# Patient Record
Sex: FEMALE | Race: WHITE | Employment: FULL TIME | URBAN - METROPOLITAN AREA
[De-identification: names, ages, dates, MRNs, and addresses within clinical notes are randomized per-mention and may not be internally consistent; named-entity substitution may affect disease eponyms.]

---

## 2017-05-08 ENCOUNTER — HOSPITAL ENCOUNTER (OUTPATIENT)
Dept: MAMMOGRAPHY | Age: 52
Discharge: HOME | End: 2017-05-08
Attending: OBSTETRICS & GYNECOLOGY
Payer: COMMERCIAL

## 2017-05-08 DIAGNOSIS — Z12.31 VISIT FOR SCREENING MAMMOGRAM: ICD-10-CM

## 2017-05-08 PROCEDURE — 77067 SCR MAMMO BI INCL CAD: CPT

## 2017-05-11 PROBLEM — Z78.0 POSTMENOPAUSAL ESTROGEN DEFICIENCY: Status: ACTIVE | Noted: 2017-05-11

## 2018-01-24 ENCOUNTER — HOSPITAL ENCOUNTER (OUTPATIENT)
Dept: LAB | Age: 53
Discharge: HOME | End: 2018-01-24
Payer: COMMERCIAL

## 2018-01-24 DIAGNOSIS — Z00.00 ANNUAL PHYSICAL EXAM: ICD-10-CM

## 2018-01-24 LAB
ALBUMIN SERPL-MCNC: 3.9 G/DL (ref 3.5–5)
ALBUMIN/GLOB SERPL: 1.3 {RATIO} (ref 1.2–3.5)
ALP SERPL-CCNC: 67 U/L (ref 50–136)
ALT SERPL-CCNC: 34 U/L (ref 12–65)
ANION GAP SERPL CALC-SCNC: 7 MMOL/L (ref 7–16)
AST SERPL-CCNC: 22 U/L (ref 15–37)
BASOPHILS # BLD: 0.1 K/UL (ref 0–0.2)
BASOPHILS NFR BLD: 1 % (ref 0–2)
BILIRUB SERPL-MCNC: 0.4 MG/DL (ref 0.2–1.1)
BUN SERPL-MCNC: 17 MG/DL (ref 6–23)
CALCIUM SERPL-MCNC: 9.2 MG/DL (ref 8.3–10.4)
CHLORIDE SERPL-SCNC: 102 MMOL/L (ref 98–107)
CHOLEST SERPL-MCNC: 187 MG/DL
CO2 SERPL-SCNC: 29 MMOL/L (ref 21–32)
CREAT SERPL-MCNC: 0.74 MG/DL (ref 0.6–1)
CRP SERPL-MCNC: <0.3 MG/DL (ref 0–0.9)
DIFFERENTIAL METHOD BLD: ABNORMAL
EOSINOPHIL # BLD: 0.1 K/UL (ref 0–0.8)
EOSINOPHIL NFR BLD: 2 % (ref 0.5–7.8)
ERYTHROCYTE [DISTWIDTH] IN BLOOD BY AUTOMATED COUNT: 13.4 % (ref 11.9–14.6)
EST. AVERAGE GLUCOSE BLD GHB EST-MCNC: 105 MG/DL
GLOBULIN SER CALC-MCNC: 3.1 G/DL (ref 2.3–3.5)
GLUCOSE SERPL-MCNC: 80 MG/DL (ref 65–100)
HBA1C MFR BLD: 5.3 % (ref 4.8–6)
HCT VFR BLD AUTO: 42.2 % (ref 35.8–46.3)
HDLC SERPL-MCNC: 100 MG/DL (ref 40–60)
HDLC SERPL: 1.9 {RATIO}
HGB BLD-MCNC: 14 G/DL (ref 11.7–15.4)
IMM GRANULOCYTES # BLD: 0 K/UL (ref 0–0.5)
IMM GRANULOCYTES NFR BLD AUTO: 0 % (ref 0–5)
LDLC SERPL CALC-MCNC: 70.4 MG/DL
LIPID PROFILE,FLP: ABNORMAL
LYMPHOCYTES # BLD: 1.8 K/UL (ref 0.5–4.6)
LYMPHOCYTES NFR BLD: 21 % (ref 13–44)
MCH RBC QN AUTO: 30.7 PG (ref 26.1–32.9)
MCHC RBC AUTO-ENTMCNC: 33.2 G/DL (ref 31.4–35)
MCV RBC AUTO: 92.5 FL (ref 79.6–97.8)
MONOCYTES # BLD: 0.5 K/UL (ref 0.1–1.3)
MONOCYTES NFR BLD: 7 % (ref 4–12)
NEUTS SEG # BLD: 5.7 K/UL (ref 1.7–8.2)
NEUTS SEG NFR BLD: 69 % (ref 43–78)
PLATELET # BLD AUTO: 252 K/UL (ref 150–450)
PMV BLD AUTO: 10.3 FL (ref 10.8–14.1)
POTASSIUM SERPL-SCNC: 4.2 MMOL/L (ref 3.5–5.1)
PROT SERPL-MCNC: 7 G/DL (ref 6.3–8.2)
RBC # BLD AUTO: 4.56 M/UL (ref 4.05–5.25)
SODIUM SERPL-SCNC: 138 MMOL/L (ref 136–145)
TRIGL SERPL-MCNC: 83 MG/DL (ref 35–150)
TSH SERPL DL<=0.005 MIU/L-ACNC: 1.26 UIU/ML (ref 0.36–3.74)
VLDLC SERPL CALC-MCNC: 16.6 MG/DL (ref 6–23)
WBC # BLD AUTO: 8.2 K/UL (ref 4.3–11.1)

## 2018-01-24 PROCEDURE — 85025 COMPLETE CBC W/AUTO DIFF WBC: CPT | Performed by: NURSE PRACTITIONER

## 2018-01-24 PROCEDURE — 84443 ASSAY THYROID STIM HORMONE: CPT | Performed by: NURSE PRACTITIONER

## 2018-01-24 PROCEDURE — 80053 COMPREHEN METABOLIC PANEL: CPT | Performed by: NURSE PRACTITIONER

## 2018-01-24 PROCEDURE — 86140 C-REACTIVE PROTEIN: CPT | Performed by: NURSE PRACTITIONER

## 2018-01-24 PROCEDURE — 36415 COLL VENOUS BLD VENIPUNCTURE: CPT | Performed by: NURSE PRACTITIONER

## 2018-01-24 PROCEDURE — 83036 HEMOGLOBIN GLYCOSYLATED A1C: CPT | Performed by: NURSE PRACTITIONER

## 2018-01-24 PROCEDURE — 80061 LIPID PANEL: CPT | Performed by: NURSE PRACTITIONER

## 2018-06-01 ENCOUNTER — HOSPITAL ENCOUNTER (OUTPATIENT)
Age: 53
Setting detail: OUTPATIENT SURGERY
Discharge: HOME | End: 2018-06-01
Attending: INTERNAL MEDICINE | Admitting: INTERNAL MEDICINE
Payer: COMMERCIAL

## 2018-06-01 ENCOUNTER — ANESTHESIA (OUTPATIENT)
Dept: ENDOSCOPY | Age: 53
End: 2018-06-01
Payer: COMMERCIAL

## 2018-06-01 ENCOUNTER — ANESTHESIA EVENT (OUTPATIENT)
Dept: ENDOSCOPY | Age: 53
End: 2018-06-01
Payer: COMMERCIAL

## 2018-06-01 VITALS
HEART RATE: 64 BPM | HEIGHT: 66 IN | TEMPERATURE: 98.5 F | RESPIRATION RATE: 16 BRPM | DIASTOLIC BLOOD PRESSURE: 60 MMHG | BODY MASS INDEX: 25.39 KG/M2 | SYSTOLIC BLOOD PRESSURE: 124 MMHG | OXYGEN SATURATION: 98 % | WEIGHT: 158 LBS

## 2018-06-01 PROCEDURE — 76040000025: Performed by: INTERNAL MEDICINE

## 2018-06-01 PROCEDURE — 76060000031 HC ANESTHESIA FIRST 0.5 HR: Performed by: INTERNAL MEDICINE

## 2018-06-01 PROCEDURE — 74011250636 HC RX REV CODE- 250/636: Performed by: ANESTHESIOLOGY

## 2018-06-01 PROCEDURE — 74011250636 HC RX REV CODE- 250/636

## 2018-06-01 RX ORDER — SODIUM CHLORIDE, SODIUM LACTATE, POTASSIUM CHLORIDE, CALCIUM CHLORIDE 600; 310; 30; 20 MG/100ML; MG/100ML; MG/100ML; MG/100ML
100 INJECTION, SOLUTION INTRAVENOUS CONTINUOUS
Status: DISCONTINUED | OUTPATIENT
Start: 2018-06-01 | End: 2018-06-01 | Stop reason: HOSPADM

## 2018-06-01 RX ORDER — MIDAZOLAM HYDROCHLORIDE 1 MG/ML
INJECTION, SOLUTION INTRAMUSCULAR; INTRAVENOUS AS NEEDED
Status: DISCONTINUED | OUTPATIENT
Start: 2018-06-01 | End: 2018-06-01 | Stop reason: HOSPADM

## 2018-06-01 RX ORDER — PROPOFOL 10 MG/ML
INJECTION, EMULSION INTRAVENOUS AS NEEDED
Status: DISCONTINUED | OUTPATIENT
Start: 2018-06-01 | End: 2018-06-01 | Stop reason: HOSPADM

## 2018-06-01 RX ORDER — SODIUM CHLORIDE, SODIUM LACTATE, POTASSIUM CHLORIDE, CALCIUM CHLORIDE 600; 310; 30; 20 MG/100ML; MG/100ML; MG/100ML; MG/100ML
100 INJECTION, SOLUTION INTRAVENOUS CONTINUOUS
Status: CANCELLED | OUTPATIENT
Start: 2018-06-01

## 2018-06-01 RX ORDER — PROPOFOL 10 MG/ML
INJECTION, EMULSION INTRAVENOUS
Status: DISCONTINUED | OUTPATIENT
Start: 2018-06-01 | End: 2018-06-01 | Stop reason: HOSPADM

## 2018-06-01 RX ORDER — ONDANSETRON 2 MG/ML
INJECTION INTRAMUSCULAR; INTRAVENOUS AS NEEDED
Status: DISCONTINUED | OUTPATIENT
Start: 2018-06-01 | End: 2018-06-01 | Stop reason: HOSPADM

## 2018-06-01 RX ORDER — SODIUM CHLORIDE 0.9 % (FLUSH) 0.9 %
5-10 SYRINGE (ML) INJECTION AS NEEDED
Status: CANCELLED | OUTPATIENT
Start: 2018-06-01

## 2018-06-01 RX ORDER — MIDAZOLAM HYDROCHLORIDE 1 MG/ML
INJECTION, SOLUTION INTRAMUSCULAR; INTRAVENOUS
Status: COMPLETED
Start: 2018-06-01 | End: 2018-06-01

## 2018-06-01 RX ADMIN — PROPOFOL 300 MCG/KG/MIN: 10 INJECTION, EMULSION INTRAVENOUS at 12:39

## 2018-06-01 RX ADMIN — SODIUM CHLORIDE, SODIUM LACTATE, POTASSIUM CHLORIDE, AND CALCIUM CHLORIDE 100 ML/HR: 600; 310; 30; 20 INJECTION, SOLUTION INTRAVENOUS at 12:08

## 2018-06-01 RX ADMIN — MIDAZOLAM HYDROCHLORIDE 2 MG: 1 INJECTION, SOLUTION INTRAMUSCULAR; INTRAVENOUS at 12:30

## 2018-06-01 RX ADMIN — PROPOFOL 50 MG: 10 INJECTION, EMULSION INTRAVENOUS at 12:39

## 2018-06-01 RX ADMIN — ONDANSETRON 4 MG: 2 INJECTION INTRAMUSCULAR; INTRAVENOUS at 12:44

## 2018-06-01 NOTE — PROGRESS NOTES
Pt to dc via wc. Pt stable at time of dc. Family received dc instructions, verbalized understanding.  Family agreed to correct dc instructions given

## 2018-06-01 NOTE — INTERVAL H&P NOTE
H&P Update:  Shu Vasquez was seen and examined. History and physical has been reviewed. The patient has been examined.  There have been no significant clinical changes since the completion of the originally dated History and Physical.    Signed By: Susan Collazo MD     June 1, 2018 12:26 PM

## 2018-06-01 NOTE — PROCEDURES
COLONOSCOPY    DATE of PROCEDURE: 6/1/2018    PT NAME: Mesha Gar     [id number]    MEDICATION:   MAC    INSTRUMENT: CFHQ 190 L    SPECIAL PROCEDURE: none  PREP: good  BLOOD LOSS- 0 or min. SPEC- no  IMPLANTS- NONE  PROCEDURE: standard colonoscopy to terminal ileum w/ retroflex view of right colon    ASSESSMENT:  1. Normal mucos  2. Moderate  to large Internal Hemorrhoids    PLAN:  1. Phone F/U  2. Surveillance 5 yrs  3.  Discuss hemorrhoid options    Jonatan Oh MD

## 2018-06-01 NOTE — ANESTHESIA POSTPROCEDURE EVALUATION
Post-Anesthesia Evaluation and Assessment    Patient: Remberto Phipps MRN: [id number]  SSN: [id number]    YOB: 1965  Age: 46 y.o. Sex: female       Cardiovascular Function/Vital Signs  Visit Vitals    /59    Pulse (!) 53    Temp 36.9 °C (98.5 °F)    Resp 16    Ht 5' 6\" (1.676 m)    Wt 71.7 kg (158 lb)    SpO2 100%    BMI 25.5 kg/m2       Patient is status post total IV anesthesia anesthesia for Procedure(s):  ESOPHAGOGASTRODUODENOSCOPY (EGD)  COLONOSCOPY/ 26.    Nausea/Vomiting: None    Postoperative hydration reviewed and adequate. Pain:  Pain Scale 1: Numeric (0 - 10) (06/01/18 1203)  Pain Intensity 1: 0 (06/01/18 1203)   Managed    Neurological Status: At baseline    Mental Status and Level of Consciousness: Arousable    Pulmonary Status:   O2 Device: Nasal cannula (06/01/18 1305)   Adequate oxygenation and airway patent    Complications related to anesthesia: None    Post-anesthesia assessment completed.  No concerns    Signed By: See Grier MD     June 1, 2018

## 2018-06-01 NOTE — DISCHARGE INSTRUCTIONS
Gastrointestinal Esophagogastroduodenoscopy (EGD) - Upper Exam Discharge Instructions    1. Call Dr. Carole Tobias at [phone] for any problems or questions. 2. Contact the doctor's office for follow up appointment as directed. 3. Medication may cause drowsiness for several hours, therefore, do not drive or operate machinery for remainder of the day. 4. No alcohol today. 5. Ordinarily, you may resume regular diet and activity after exam unless otherwise specified by your physician. 6. For mild soreness in your throat you may use Cepacol throat lozenges or warm salt-water gargles as needed. Gastrointestinal Colonoscopy/Flexible Sigmoidoscopy - Lower Exam Discharge Instructions  1. Because of air put into your colon during exam, you may experience some abdominal distension, relieved by the passage of gas, for several hours. 2. Contact your physician if you have any of the following:  a. Excessive amount of bleeding - large amount when having a bowel movement. Occasional specks of blood with bowel movement would not be unusual.  b. Severe abdominal pain  c. Fever or Chills  Any additional instructions:  Consider banding      Instructions given to [address] [place] [address] and other family members.   Instructions given by:

## 2018-06-01 NOTE — H&P
>      Patient:  Shoaib Roberts  YOB: 1965   Date:                       2018 3:00 PM   Visit Type:                 Office Visit        This 46year old female presents for Rectal bleeding. History of Present Illness:  1. Rectal bleeding   46year old known female patient to Dr. Raquel Ortiz returns to the clinic today self referred for rectal bleeding. She has continued to have intermittent BRRB since . It is with a BM. It is in the commode and on the TP. She denies any rectal pain or abdominal pain. She has increased \"fullness and gas. \"  She has a BM daily. She is on a probiotic and biocleanse. She does feel emptied out sometimes. She underwent a colonoscopy on 14 by Dr. Raquel Ortiz that revealed 1cm rectal polyp, internal and external hemorrhoids. Her pathology revealed a tubulovillous adenoma with recommended repeat surveillance in 3 years. She denies any anticoagulants. She takes occasional OTC NSAID. She does notice increased bleeding with ETOH. She had a Hgb last week and it was 12.4 per patient report. 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 Raquel Ortiz that revealed 1cm rectal polyp, internal and external hemorrhoids. Her pathology revealed a tubulovillous adenoma with recommended repeat surveillance in 3 years. Family History  (Detailed)  Relationship Family Member Name  Age at Death Condition Onset Age Cause of Death       No family history of Cancer, colon  N   Maternal grandmother    Cancer, throat  N         Social History:  (Detailed)  Tobacco use reviewed. Preferred language is Chriss Schaumann. Smoking status: Former smoker.     SMOKING STATUS  Type Smoking Status Usage Per Day Years Used Total Pack Years Former smoker        ALCOHOL  There is a history of alcohol use. 3-4 drinks consumed weekly. CAFFEINE  The patient uses caffeine: coffee - 3 cups a day. CURRENT MEDICATIONS  Medication Name Sig Desc   Klonopin 1 mg tablet take 1 tablet by oral route  every day as needed   Minivelle 0.1 mg/24 hr transdermal patch apply 1 patch by transdermal route 2 times every week   lamotrigine 25 mg tablet take 2 tablet by oral route 2 times every day   Bystolic 5 mg tablet take 1 tablet by oral route  every day     Allergies:  Ingredient Reaction (Severity) Medication Name Comment   NO KNOWN ALLERGIES      Reviewed, no changes. Review of Systems  System Neg/Pos Details   Cardio Positive Irregular heartbeat/palpitations. GI Positive Rectal bleeding. Psych Positive Anxiety, Panic attacks. All other review of systems are negative. VITAL SIGNS:  BP mm/Hg Pulse/min Resp/min Temp F Height (Total in.) Weight (lbs.) Weight (oz.) BMI   118/62 62 18  66.50 164.80  26.20       PHYSICAL EXAM:  GENERAL: well nourished, well hydrated, who appears their stated age. SKIN: no rashes, jaundice, palmar erythema,or telangiectasias   HEENT:Conjunctiva pink, Sclerae anicteric. No oral ulcerations. PERRLA. Neck without obvious thyromegaly. MALLAMPATI 2  CARDIOVASCULAR: S1 and S2. No murmurs, gallops or rubs. RESPIRATORY:  Vesicular breath sounds bilaterally without  rales or rhonchi. GI: The abdomen is soft, nondistended, and nontender with distracted exam. Normoactive bowel sounds x 4 quadrants. No obvious hepatomegaly or splenomegaly. RECTAL: Deferred. EXTREMITIES: without cyanosis, clubbing, edema or ecchymosis  NEUROLOGICAL:  Patient is alert and oriented x 4 without any obvious gross deficits. PSYCHIATRIC: Mood appears appropriate with judgement intact. LYMPHATIC: No cervical or supraclavicular adenopathy.    ASA 2    Medications Prescribed/Renewed (this encounter)  Medication Directions   Anusol-HC 25 mg rectal suppository insert 1 suppository by rectal route 2 times every day for 2 weeks     Assessment/Plan  # Detail Type Description    1. Assessment History of colon polyps (Z86.010). Provider Plan 46year old known female patient to Dr. Landon Flood returns to the clinic today self referred for rectal bleeding and history of colon polyps. She has continued to have intermittent BRRB since 2014. She underwent a colonoscopy on 5/20/14 by Dr. Landon Flood that revealed 1cm rectal polyp, internal and external hemorrhoids. Her pathology revealed a tubulovillous adenoma with recommended repeat surveillance in 3 years. 1.Schedule surveillance colonoscopy with Dr. Landon Flood. The risks of the procedure was discussed in detail with patient including, but not limited to, anesthesia, bleeding, infection, perforation and missed colon polyp and possible mortality. Patient verbalizes understanding and wishes to proceed with Colonoscopy. Plan Orders She is to schedule a follow-up visit for to be determined after testing/procedure. 2. Assessment Rectal bleeding (K62.5). Provider Plan likely secondary to known hemorrhoids. 1.Trial of Anusol HC supp 25 mg one pr bid x 14 days #28 with 1 RF  2. Continue probiotic    Plan Orders Further diagnostic evaluations ordered today include(s) Colonoscopy to be performed. 3. Assessment Internal hemorrhoids (K64.8). Provider Plan on previous colonoscopy and with current rectal bleeding. 1.Hemorrhoidal handout                  Counseling / Educational Factors:  Items reviewed / discussed during today's visit: prior testing / procedures were reviewed; indications and risks of the procedure were discussed; prescription medication usage was discussed; symptomatic care was discussed; advised to continue current treatment. Patient expressed understanding of instructions. Other advice: HEMORRHOIDAL HANDOUT.     The patient was checked out at 3:40 PM by [facility]. Milagro. Elements of this note may have been dictated using speech recognition software. As a result, errors of speech recognition may have occurred. Provider:   Shan Arteaga 05/08/2018 3:46 PM   Document generated by: Mac Osorio. Alyson Raymond 05/08/2018    CC Providers:  Xiomara Ames MD, MD  [facility]  [address] [facility] [place] [address]-          Electronically signed by Lynsey Alejo on 05/08/2018 03:46 PM

## 2018-06-01 NOTE — PROCEDURES
ESOPHAGOGASTRODUODENOSCOPY    DATE of PROCEDURE: 6/1/2018    PT NAME: Ward Gar     [id number]    MEDICATION:   MAC        INSTRUMENT: GIFH 190    SPECIAL PROCEDURE: none  BLOOD LOSS- 0 or min. SPEC- no  IMPLANT- none    PROCEDURE:  Standard EGD      ASSESSMENT:  1. Normal     PLAN:   1.  Edi Anguiano MD

## 2018-06-01 NOTE — ANESTHESIA PREPROCEDURE EVALUATION
Anesthetic History   No history of anesthetic complications            Review of Systems / Medical History  Patient summary reviewed, nursing notes reviewed and pertinent labs reviewed    Pulmonary          Smoker         Neuro/Psych         Psychiatric history    Comments: Anxiety Cardiovascular            Dysrhythmias : PVC      Exercise tolerance: >4 METS     GI/Hepatic/Renal  Within defined limits              Endo/Other  Within defined limits           Other Findings              Physical Exam    Airway  Mallampati: II    Neck ROM: normal range of motion   Mouth opening: Normal     Cardiovascular    Rhythm: regular           Dental  No notable dental hx       Pulmonary  Breath sounds clear to auscultation               Abdominal         Other Findings            Anesthetic Plan    ASA: 2  Anesthesia type: total IV anesthesia            Anesthetic plan and risks discussed with: Patient

## 2018-06-14 ENCOUNTER — HOSPITAL ENCOUNTER (OUTPATIENT)
Dept: MAMMOGRAPHY | Age: 53
Discharge: HOME | End: 2018-06-14
Attending: OBSTETRICS & GYNECOLOGY
Payer: COMMERCIAL

## 2018-06-14 DIAGNOSIS — R92.2 DENSE BREAST TISSUE ON MAMMOGRAM: ICD-10-CM

## 2018-06-14 DIAGNOSIS — Z12.31 SCREENING MAMMOGRAM, ENCOUNTER FOR: ICD-10-CM

## 2018-06-14 PROCEDURE — 77063 BREAST TOMOSYNTHESIS BI: CPT

## 2018-06-28 ENCOUNTER — HOSPITAL ENCOUNTER (OUTPATIENT)
Dept: MAMMOGRAPHY | Age: 53
Discharge: HOME | End: 2018-06-28
Attending: OBSTETRICS & GYNECOLOGY
Payer: COMMERCIAL

## 2018-06-28 DIAGNOSIS — R92.8 ABNORMAL FINDING ON MAMMOGRAPHY: ICD-10-CM

## 2018-06-28 PROCEDURE — 76642 ULTRASOUND BREAST LIMITED: CPT

## 2018-08-31 ENCOUNTER — HOSPITAL ENCOUNTER (OUTPATIENT)
Dept: LAB | Age: 53
Discharge: HOME | End: 2018-08-31
Payer: COMMERCIAL

## 2018-08-31 DIAGNOSIS — Z00.00 ROUTINE GENERAL MEDICAL EXAMINATION AT A HEALTH CARE FACILITY: ICD-10-CM

## 2018-08-31 LAB
ALBUMIN SERPL-MCNC: 3.3 G/DL (ref 3.5–5)
ALBUMIN/GLOB SERPL: 1.1 {RATIO} (ref 1.2–3.5)
ALP SERPL-CCNC: 68 U/L (ref 50–136)
ALT SERPL-CCNC: 28 U/L (ref 12–65)
ANION GAP SERPL CALC-SCNC: 8 MMOL/L (ref 7–16)
APPEARANCE UR: CLEAR
AST SERPL-CCNC: 17 U/L (ref 15–37)
BASOPHILS # BLD: 0.1 K/UL (ref 0–0.2)
BASOPHILS NFR BLD: 1 % (ref 0–2)
BILIRUB SERPL-MCNC: 0.2 MG/DL (ref 0.2–1.1)
BILIRUB UR QL: NEGATIVE
BUN SERPL-MCNC: 12 MG/DL (ref 6–23)
CALCIUM SERPL-MCNC: 8.4 MG/DL (ref 8.3–10.4)
CHLORIDE SERPL-SCNC: 110 MMOL/L (ref 98–107)
CHOLEST SERPL-MCNC: 161 MG/DL
CO2 SERPL-SCNC: 27 MMOL/L (ref 21–32)
COLOR UR: YELLOW
CREAT SERPL-MCNC: 0.75 MG/DL (ref 0.6–1)
DIFFERENTIAL METHOD BLD: NORMAL
EOSINOPHIL # BLD: 0.2 K/UL (ref 0–0.8)
EOSINOPHIL NFR BLD: 4 % (ref 0.5–7.8)
ERYTHROCYTE [DISTWIDTH] IN BLOOD BY AUTOMATED COUNT: 14.1 %
EST. AVERAGE GLUCOSE BLD GHB EST-MCNC: NORMAL MG/DL
GLOBULIN SER CALC-MCNC: 3.1 G/DL (ref 2.3–3.5)
GLUCOSE SERPL-MCNC: 71 MG/DL (ref 65–100)
GLUCOSE UR STRIP.AUTO-MCNC: NEGATIVE MG/DL
HBA1C MFR BLD: 4.8 % (ref 4.8–6)
HCT VFR BLD AUTO: 38.1 % (ref 35.8–46.3)
HDLC SERPL-MCNC: 76 MG/DL (ref 40–60)
HDLC SERPL: 2.1 {RATIO}
HGB BLD-MCNC: 12.3 G/DL (ref 11.7–15.4)
HGB UR QL STRIP: NEGATIVE
IMM GRANULOCYTES # BLD: 0 K/UL (ref 0–0.5)
IMM GRANULOCYTES NFR BLD AUTO: 0 % (ref 0–5)
KETONES UR QL STRIP.AUTO: ABNORMAL MG/DL
LDLC SERPL CALC-MCNC: 49.6 MG/DL
LEUKOCYTE ESTERASE UR QL STRIP.AUTO: NEGATIVE
LIPID PROFILE,FLP: ABNORMAL
LYMPHOCYTES # BLD: 1.6 K/UL (ref 0.5–4.6)
LYMPHOCYTES NFR BLD: 32 % (ref 13–44)
MCH RBC QN AUTO: 30.4 PG (ref 26.1–32.9)
MCHC RBC AUTO-ENTMCNC: 32.3 G/DL (ref 31.4–35)
MCV RBC AUTO: 94.1 FL (ref 79.6–97.8)
MONOCYTES # BLD: 0.3 K/UL (ref 0.1–1.3)
MONOCYTES NFR BLD: 6 % (ref 4–12)
NEUTS SEG # BLD: 2.8 K/UL (ref 1.7–8.2)
NEUTS SEG NFR BLD: 57 % (ref 43–78)
NITRITE UR QL STRIP.AUTO: NEGATIVE
NRBC # BLD: 0 K/UL (ref 0–0.2)
PH UR STRIP: 7.5 [PH] (ref 5–9)
PLATELET # BLD AUTO: 255 K/UL (ref 150–450)
PMV BLD AUTO: 10.7 FL (ref 9.4–12.3)
POTASSIUM SERPL-SCNC: 4.1 MMOL/L (ref 3.5–5.1)
PROT SERPL-MCNC: 6.4 G/DL (ref 6.3–8.2)
PROT UR STRIP-MCNC: ABNORMAL MG/DL
RBC # BLD AUTO: 4.05 M/UL (ref 4.05–5.2)
SODIUM SERPL-SCNC: 145 MMOL/L (ref 136–145)
SP GR UR REFRACTOMETRY: 1.02 (ref 1–1.02)
TRIGL SERPL-MCNC: 177 MG/DL (ref 35–150)
TSH SERPL DL<=0.005 MIU/L-ACNC: 0.93 UIU/ML (ref 0.36–3.74)
UROBILINOGEN UR QL STRIP.AUTO: 0.2 EU/DL (ref 0.2–1)
VLDLC SERPL CALC-MCNC: 35.4 MG/DL (ref 6–23)
WBC # BLD AUTO: 5 K/UL (ref 4.3–11.1)

## 2018-08-31 PROCEDURE — 80053 COMPREHEN METABOLIC PANEL: CPT

## 2018-08-31 PROCEDURE — 84443 ASSAY THYROID STIM HORMONE: CPT

## 2018-08-31 PROCEDURE — 36415 COLL VENOUS BLD VENIPUNCTURE: CPT

## 2018-08-31 PROCEDURE — 80061 LIPID PANEL: CPT

## 2018-08-31 PROCEDURE — 85025 COMPLETE CBC W/AUTO DIFF WBC: CPT

## 2018-08-31 PROCEDURE — 83036 HEMOGLOBIN GLYCOSYLATED A1C: CPT

## 2018-08-31 PROCEDURE — 81003 URINALYSIS AUTO W/O SCOPE: CPT

## 2018-09-05 PROBLEM — K59.09 CHRONIC CONSTIPATION: Status: ACTIVE | Noted: 2018-09-05

## 2018-09-05 PROBLEM — K64.8 INTERNAL HEMORRHOID: Status: ACTIVE | Noted: 2018-09-05

## 2019-09-12 PROBLEM — Z72.0 TOBACCO USE: Status: ACTIVE | Noted: 2019-09-12

## 2019-09-26 ENCOUNTER — HOSPITAL ENCOUNTER (OUTPATIENT)
Dept: LAB | Age: 54
Discharge: HOME | End: 2019-09-26
Payer: COMMERCIAL

## 2019-09-26 DIAGNOSIS — R53.82 CHRONIC FATIGUE: ICD-10-CM

## 2019-09-26 PROCEDURE — 82306 VITAMIN D 25 HYDROXY: CPT

## 2019-09-26 PROCEDURE — 36415 COLL VENOUS BLD VENIPUNCTURE: CPT

## 2019-09-27 LAB — 25(OH)D3+25(OH)D2 SERPL-MCNC: 38.8 NG/ML (ref 30–100)

## 2020-01-20 ENCOUNTER — HOSPITAL ENCOUNTER (OUTPATIENT)
Dept: MAMMOGRAPHY | Age: 55
Discharge: HOME | End: 2020-01-20
Attending: INTERNAL MEDICINE
Payer: COMMERCIAL

## 2020-01-20 DIAGNOSIS — Z12.31 ENCOUNTER FOR SCREENING MAMMOGRAM FOR BREAST CANCER: ICD-10-CM

## 2020-01-20 PROCEDURE — 77067 SCR MAMMO BI INCL CAD: CPT

## 2021-01-12 ENCOUNTER — TRANSCRIBE ORDER (OUTPATIENT)
Dept: SCHEDULING | Age: 56
End: 2021-01-12

## 2021-01-12 DIAGNOSIS — Z12.31 SCREENING MAMMOGRAM FOR HIGH-RISK PATIENT: Primary | ICD-10-CM

## 2021-01-25 ENCOUNTER — HOSPITAL ENCOUNTER (OUTPATIENT)
Dept: MAMMOGRAPHY | Age: 56
Discharge: HOME | End: 2021-01-25
Attending: OBSTETRICS & GYNECOLOGY
Payer: COMMERCIAL

## 2021-01-25 DIAGNOSIS — Z12.31 SCREENING MAMMOGRAM FOR HIGH-RISK PATIENT: ICD-10-CM

## 2021-01-25 PROCEDURE — 77063 BREAST TOMOSYNTHESIS BI: CPT

## 2022-01-19 ENCOUNTER — TRANSCRIBE ORDER (OUTPATIENT)
Dept: SCHEDULING | Age: 57
End: 2022-01-19

## 2022-01-19 DIAGNOSIS — Z12.31 ENCOUNTER FOR SCREENING MAMMOGRAM FOR MALIGNANT NEOPLASM OF BREAST: Primary | ICD-10-CM

## 2022-03-16 ENCOUNTER — HOSPITAL ENCOUNTER (OUTPATIENT)
Dept: MAMMOGRAPHY | Age: 57
Discharge: HOME | End: 2022-03-16
Attending: OBSTETRICS & GYNECOLOGY
Payer: COMMERCIAL

## 2022-03-16 DIAGNOSIS — Z12.31 ENCOUNTER FOR SCREENING MAMMOGRAM FOR MALIGNANT NEOPLASM OF BREAST: ICD-10-CM

## 2022-03-16 PROCEDURE — 77063 BREAST TOMOSYNTHESIS BI: CPT

## 2022-03-18 PROBLEM — Z78.0 POSTMENOPAUSAL ESTROGEN DEFICIENCY: Status: ACTIVE | Noted: 2017-05-11

## 2022-03-18 PROBLEM — K64.8 INTERNAL HEMORRHOID: Status: ACTIVE | Noted: 2018-09-05

## 2022-03-20 PROBLEM — Z72.0 TOBACCO USE: Status: ACTIVE | Noted: 2019-09-12

## 2022-03-20 PROBLEM — K59.09 CHRONIC CONSTIPATION: Status: ACTIVE | Noted: 2018-09-05

## 2022-03-23 ENCOUNTER — HOSPITAL ENCOUNTER (OUTPATIENT)
Dept: LAB | Age: 57
Discharge: HOME | End: 2022-03-23
Payer: COMMERCIAL

## 2022-03-23 DIAGNOSIS — Z00.00 ANNUAL PHYSICAL EXAM: ICD-10-CM

## 2022-03-23 LAB
ALBUMIN SERPL-MCNC: 3.6 G/DL (ref 3.5–5)
ALBUMIN/GLOB SERPL: 1.2 {RATIO} (ref 1.2–3.5)
ALP SERPL-CCNC: 73 U/L (ref 50–136)
ALT SERPL-CCNC: 54 U/L (ref 12–65)
ANION GAP SERPL CALC-SCNC: 2 MMOL/L (ref 7–16)
AST SERPL-CCNC: 34 U/L (ref 15–37)
BASOPHILS # BLD: 0.1 K/UL (ref 0–0.2)
BASOPHILS NFR BLD: 1 % (ref 0–2)
BILIRUB SERPL-MCNC: 0.5 MG/DL (ref 0.2–1.1)
BUN SERPL-MCNC: 19 MG/DL (ref 6–23)
CALCIUM SERPL-MCNC: 9.5 MG/DL (ref 8.3–10.4)
CHLORIDE SERPL-SCNC: 109 MMOL/L (ref 98–107)
CHOLEST SERPL-MCNC: 207 MG/DL
CO2 SERPL-SCNC: 28 MMOL/L (ref 21–32)
CREAT SERPL-MCNC: 0.8 MG/DL (ref 0.6–1)
DIFFERENTIAL METHOD BLD: NORMAL
EOSINOPHIL # BLD: 0.2 K/UL (ref 0–0.8)
EOSINOPHIL NFR BLD: 4 % (ref 0.5–7.8)
ERYTHROCYTE [DISTWIDTH] IN BLOOD BY AUTOMATED COUNT: 14.3 % (ref 11.9–14.6)
GLOBULIN SER CALC-MCNC: 3 G/DL (ref 2.3–3.5)
GLUCOSE SERPL-MCNC: 94 MG/DL (ref 65–100)
HCT VFR BLD AUTO: 39 % (ref 35.8–46.3)
HDLC SERPL-MCNC: 105 MG/DL (ref 40–60)
HDLC SERPL: 2 {RATIO}
HGB BLD-MCNC: 12.5 G/DL (ref 11.7–15.4)
IMM GRANULOCYTES # BLD AUTO: 0 K/UL (ref 0–0.5)
IMM GRANULOCYTES NFR BLD AUTO: 0 % (ref 0–5)
LDLC SERPL CALC-MCNC: 87.2 MG/DL
LYMPHOCYTES # BLD: 1.6 K/UL (ref 0.5–4.6)
LYMPHOCYTES NFR BLD: 32 % (ref 13–44)
MCH RBC QN AUTO: 29.1 PG (ref 26.1–32.9)
MCHC RBC AUTO-ENTMCNC: 32.1 G/DL (ref 31.4–35)
MCV RBC AUTO: 90.7 FL (ref 79.6–97.8)
MONOCYTES # BLD: 0.4 K/UL (ref 0.1–1.3)
MONOCYTES NFR BLD: 8 % (ref 4–12)
NEUTS SEG # BLD: 2.8 K/UL (ref 1.7–8.2)
NEUTS SEG NFR BLD: 55 % (ref 43–78)
NRBC # BLD: 0 K/UL (ref 0–0.2)
PLATELET # BLD AUTO: 232 K/UL (ref 150–450)
PMV BLD AUTO: 10.7 FL (ref 9.4–12.3)
POTASSIUM SERPL-SCNC: 4.1 MMOL/L (ref 3.5–5.1)
PROT SERPL-MCNC: 6.6 G/DL (ref 6.3–8.2)
RBC # BLD AUTO: 4.3 M/UL (ref 4.05–5.2)
SODIUM SERPL-SCNC: 139 MMOL/L (ref 136–145)
TRIGL SERPL-MCNC: 74 MG/DL (ref 35–150)
TSH SERPL DL<=0.005 MIU/L-ACNC: 1.1 UIU/ML (ref 0.36–3.74)
VLDLC SERPL CALC-MCNC: 14.8 MG/DL (ref 6–23)
WBC # BLD AUTO: 5.1 K/UL (ref 4.3–11.1)

## 2022-03-23 PROCEDURE — 36415 COLL VENOUS BLD VENIPUNCTURE: CPT

## 2022-03-23 PROCEDURE — 85025 COMPLETE CBC W/AUTO DIFF WBC: CPT

## 2022-03-23 PROCEDURE — 80061 LIPID PANEL: CPT

## 2022-03-23 PROCEDURE — 84443 ASSAY THYROID STIM HORMONE: CPT

## 2022-03-23 PROCEDURE — 80053 COMPREHEN METABOLIC PANEL: CPT

## 2022-05-24 ENCOUNTER — OFFICE VISIT (OUTPATIENT)
Dept: OBGYN CLINIC | Age: 57
End: 2022-05-24
Payer: COMMERCIAL

## 2022-05-24 VITALS
HEIGHT: 66 IN | DIASTOLIC BLOOD PRESSURE: 60 MMHG | SYSTOLIC BLOOD PRESSURE: 110 MMHG | BODY MASS INDEX: 25.43 KG/M2 | WEIGHT: 158.2 LBS

## 2022-05-24 DIAGNOSIS — Z01.419 WELL WOMAN EXAM: Primary | ICD-10-CM

## 2022-05-24 DIAGNOSIS — Z11.51 SCREENING FOR HUMAN PAPILLOMAVIRUS (HPV): ICD-10-CM

## 2022-05-24 DIAGNOSIS — Z12.4 SCREENING FOR CERVICAL CANCER: ICD-10-CM

## 2022-05-24 DIAGNOSIS — Z13.89 SCREENING FOR GENITOURINARY CONDITION: ICD-10-CM

## 2022-05-24 DIAGNOSIS — Z12.31 SCREENING MAMMOGRAM FOR BREAST CANCER: ICD-10-CM

## 2022-05-24 LAB
BILIRUBIN, URINE, POC: NEGATIVE
BLOOD URINE, POC: NEGATIVE
GLUCOSE URINE, POC: NEGATIVE
KETONES, URINE, POC: NORMAL
LEUKOCYTE ESTERASE, URINE, POC: NEGATIVE
NITRITE, URINE, POC: NEGATIVE
PH, URINE, POC: 5.5 (ref 4.6–8)
PROTEIN,URINE, POC: NEGATIVE
SPECIFIC GRAVITY, URINE, POC: 1.03 (ref 1–1.03)
URINALYSIS CLARITY, POC: CLEAR
URINALYSIS COLOR, POC: YELLOW
UROBILINOGEN, POC: NORMAL

## 2022-05-24 PROCEDURE — 81000 URINALYSIS NONAUTO W/SCOPE: CPT | Performed by: OBSTETRICS & GYNECOLOGY

## 2022-05-24 PROCEDURE — 99396 PREV VISIT EST AGE 40-64: CPT | Performed by: OBSTETRICS & GYNECOLOGY

## 2022-05-24 RX ORDER — ESTRADIOL 0.1 MG/D
1 FILM, EXTENDED RELEASE TRANSDERMAL
Qty: 24 PATCH | Refills: 3 | Status: SHIPPED | OUTPATIENT
Start: 2022-05-26

## 2022-05-24 NOTE — PROGRESS NOTES
HPI:  Ms. Indigo Cage is a 64 y.o.   OB History        2    Para   2    Term   2            AB        Living   2       SAB        IAB        Ectopic        Molar        Multiple        Live Births   2             who is here today for a well woman exam. She complains of nothing. Needs refills on hormones. States she does have bleeding after having a bowel movement. Will be following up with Carla Tomlinson regarding her internal hemorrhoids     Date Performed Result   PAP 2019 Negative HPV negative with Genella Perez    Mammogram 3/16/2022 Negative BD3   Colonoscopy  WNL   Dexa na          No obstetric history on file. GYN History           No LMP recorded. Patient has had a hysterectomy. Past Medical History:  Past Medical History:   Diagnosis Date    Abnormal Pap smear     Arrhythmia     wpw-- in college--- no meds -- no problems--- no symptoms    Colon polyp     Benign    Constipation     Menopause     Panic disorder     Pelvic pain in female 2014    Rectal bleeding x 6 months    Routine gynecological examination 2014    Screening for malignant neoplasm of the rectum 2014    Snores     never been tested for sleep apnea       Past Surgical History:  Past Surgical History:   Procedure Laterality Date    BREAST SURGERY  age 27--     then removed     COLONOSCOPY      Polyp- TVA    COLONOSCOPY  2018    Internal hemorrhoid    COLONOSCOPY N/A 2018    COLONOSCOPY/ 26 performed by Adriel Gao MD at [facility], TOTAL ABDOMINAL      IMPLANT BREAST SILICONE/EQ Bilateral     Removed - 10 years ago    OTHER SURGICAL HISTORY  2019    Tooth Implant    UPPER GASTROINTESTINAL ENDOSCOPY  2018       Allergies:   No Known Allergies    Medication History:  Current Outpatient Medications   Medication Sig Dispense Refill    clonazePAM (KLONOPIN) 0.5 MG tablet Take 1 mg by mouth 3 times daily as needed.       estradiol (MINIVELLE) 0.1 MG/24HR Place 1 patch onto the skin      lamoTRIgine (LAMICTAL) 25 MG tablet Take 50 mg by mouth 2 times daily      linaCLOtide (LINZESS) 72 MCG CAPS capsule Take 72 mcg by mouth every morning (before breakfast)      nebivolol (BYSTOLIC) 5 MG tablet TAKE 1 TABLET BY MOUTH ONE TIME A DAY      zolpidem (AMBIEN) 5 MG tablet Take 5 mg by mouth.  estradiol (ESTRACE) 1 MG tablet Take 1 mg by mouth daily       No current facility-administered medications for this visit. Social History:  Social History     Socioeconomic History    Marital status:      Spouse name: Not on file    Number of children: Not on file    Years of education: Not on file    Highest education level: Not on file   Occupational History    Not on file   Tobacco Use    Smoking status: Former Smoker     Packs/day: 0.25    Smokeless tobacco: Never Used    Tobacco comment: Quit smoking: Socially   Substance and Sexual Activity    Alcohol use: Yes     Alcohol/week: 2.0 standard drinks    Drug use: No    Sexual activity: Not on file     Comment: Hyst   Other Topics Concern    Not on file   Social History Narrative    Not on file     Social Determinants of Health     Financial Resource Strain:     Difficulty of Paying Living Expenses: Not on file   Food Insecurity:     Worried About Running Out of Food in the Last Year: Not on file    [place] of Food in the Last Year: Not on file   Transportation Needs:     Lack of Transportation (Medical): Not on file    Lack of Transportation (Non-Medical):  Not on file   Physical Activity:     Days of Exercise per Week: Not on file    Minutes of Exercise per Session: Not on file   Stress:     Feeling of Stress : Not on file   Social Connections:     Frequency of Communication with Friends and Family: Not on file    Frequency of Social Gatherings with Friends and Family: Not on file    Attends [employer] Services: Not on file   [employer] of Clubs or Organizations: Not on file    Attends Club or Organization Meetings: Not on file    Marital Status: Not on file   Intimate Partner Violence:     Fear of Current or Ex-Partner: Not on file    Emotionally Abused: Not on file    Physically Abused: Not on file    Sexually Abused: Not on file   Housing Stability:     Unable to Pay for Housing in the Last Year: Not on file    Number of [place] in the Last Year: Not on file    Unstable Housing in the Last Year: Not on file       Family History:  Family History   Problem Relation Age of Onset    Heart Disease Father     Coronary Art Dis Father     Cataracts Father     Anemia Mother         pernicious    Thyroid Disease Mother     Cataracts Mother     Heart Disease Paternal Grandfather     Coronary Art Dis Other         fam hx     Breast Cancer Paternal Grandmother         66's    Cancer Brother         CLL- Leukemia    Cancer Maternal Grandmother         Larengeal Ca     Heart Attack Maternal Grandfather     Diabetes Mother         Type 2       Review of Systems - General ROS: negative except for that discussed in HPI      ROS:  Feeling well. No dyspnea or chest pain on exertion. No abdominal pain, change in bowel habits, black or bloody stools. No urinary tract symptoms. No neurological complaints. Objective:   /60   Ht 5' 6\" (1.676 m)   Wt 158 lb 3.2 oz (71.8 kg)   BMI 25.53 kg/m²   [email or web address]    The patient appears well, alert, oriented x 3, in no distress. ENT normal.  Neck supple. No adenopathy or thyromegaly. Lungs:  clear, good air entry, no wheezes, rhonchi or rales. Heart:  S1 and S2 normal, no murmurs, regular rate and rhythm. Abdomen:  soft without tenderness, guarding, mass or organomegaly. Extremities show no edema, normal peripheral pulses. Neurological is normal, no focal findings.     BREAST EXAM: breasts appear normal, no suspicious masses, no skin or nipple changes or axillary nodes    PELVIC EXAM: External genitalia is normal  urethra, urethra meatus and bladder are midline well supported. Vagina is well rugated, Cervix absent  no ovarian masses palpated    Assessment/Plan:      Diagnosis Orders   1. Well woman exam  AMB POC URINALYSIS DIP STICK MANUAL W/ MICRO    OLGA SHERRIE DIGITAL SCREEN BILATERAL   2. Screening for genitourinary condition  AMB POC URINALYSIS DIP STICK MANUAL W/ MICRO   3. Screening for cervical cancer     4. Screening for human papillomavirus (HPV)     5. Screening mammogram for breast cancer  OLGA SHERRIE DIGITAL SCREEN BILATERAL     Encounter Diagnoses   Name Primary?     Well woman exam Yes    Screening for genitourinary condition     Screening for cervical cancer     Screening for human papillomavirus (HPV)     Screening mammogram for breast cancer      Orders Placed This Encounter   Procedures    OLGA SHERRIE DIGITAL SCREEN BILATERAL     Standing Status:   Future     Standing Expiration Date:   7/24/2023    AMB POC URINALYSIS DIP STICK MANUAL W/ MICRO       counseled on breast self exam and use and side effects of HRT  Continue HRT patch  Pt wished best of luck with new epic they are  Extraordinarily frustrated  return annually or prn

## 2022-11-02 ENCOUNTER — NURSE TRIAGE (OUTPATIENT)
Dept: OTHER | Facility: CLINIC | Age: 57
End: 2022-11-02

## 2022-11-02 NOTE — TELEPHONE ENCOUNTER
Location of patient: [place]    Location of employment: [employer]    Department where injury occurred: OR    Location of injury (body part involved): wrist injury, neck, left back and hip    Time of injury: 10:00    Last 4 of SSN: [id number]    Subjective: Caller states \"tripped over a cord and landed on left hand and hip\"     Current Symptoms: wrist was sore when first happened but that is fine now. Left upper back area and neck is sore  Able to continuing working    Pain Severity: 3/10; Temperature: denies     What has been tried: nothing    LMP: NA Pregnant: NA    Recommended disposition: Mehrdad Arias 7932 advice provided, caller verbalizes understanding; denies any other questions or concerns. SafeCare is completed. Employee will call back as needed.     Reason for Disposition   Back pain or stiffness from bending or twisting injury    Protocols used: Back Injury-ADULT-[place]

## 2023-02-24 ENCOUNTER — TELEPHONE (OUTPATIENT)
Dept: INTERNAL MEDICINE CLINIC | Facility: CLINIC | Age: 58
End: 2023-02-24

## 2023-02-24 DIAGNOSIS — Z00.00 LABORATORY EXAMINATION ORDERED AS PART OF A ROUTINE GENERAL MEDICAL EXAMINATION: Primary | ICD-10-CM

## 2023-03-07 DIAGNOSIS — Z00.00 LABORATORY EXAMINATION ORDERED AS PART OF A ROUTINE GENERAL MEDICAL EXAMINATION: ICD-10-CM

## 2023-03-07 LAB
APPEARANCE UR: CLEAR
BASOPHILS # BLD: 0.1 K/UL (ref 0–0.2)
BASOPHILS NFR BLD: 1 % (ref 0–2)
BILIRUB UR QL: NEGATIVE
COLOR UR: ABNORMAL
DIFFERENTIAL METHOD BLD: ABNORMAL
EOSINOPHIL # BLD: 0.1 K/UL (ref 0–0.8)
EOSINOPHIL NFR BLD: 1 % (ref 0.5–7.8)
ERYTHROCYTE [DISTWIDTH] IN BLOOD BY AUTOMATED COUNT: 13.9 % (ref 11.9–14.6)
GLUCOSE UR STRIP.AUTO-MCNC: NEGATIVE MG/DL
HCT VFR BLD AUTO: 42.8 % (ref 35.8–46.3)
HGB BLD-MCNC: 13.4 G/DL (ref 11.7–15.4)
HGB UR QL STRIP: NEGATIVE
IMM GRANULOCYTES # BLD AUTO: 0 K/UL (ref 0–0.5)
IMM GRANULOCYTES NFR BLD AUTO: 1 % (ref 0–5)
KETONES UR QL STRIP.AUTO: NEGATIVE MG/DL
LEUKOCYTE ESTERASE UR QL STRIP.AUTO: NEGATIVE
LYMPHOCYTES # BLD: 1.7 K/UL (ref 0.5–4.6)
LYMPHOCYTES NFR BLD: 25 % (ref 13–44)
MCH RBC QN AUTO: 29.5 PG (ref 26.1–32.9)
MCHC RBC AUTO-ENTMCNC: 31.3 G/DL (ref 31.4–35)
MCV RBC AUTO: 94.3 FL (ref 82–102)
MONOCYTES # BLD: 0.4 K/UL (ref 0.1–1.3)
MONOCYTES NFR BLD: 7 % (ref 4–12)
NEUTS SEG # BLD: 4.4 K/UL (ref 1.7–8.2)
NEUTS SEG NFR BLD: 65 % (ref 43–78)
NITRITE UR QL STRIP.AUTO: NEGATIVE
NRBC # BLD: 0 K/UL (ref 0–0.2)
PH UR STRIP: 5.5 (ref 5–9)
PLATELET # BLD AUTO: 242 K/UL (ref 150–450)
PMV BLD AUTO: 11.2 FL (ref 9.4–12.3)
PROT UR STRIP-MCNC: NEGATIVE MG/DL
RBC # BLD AUTO: 4.54 M/UL (ref 4.05–5.2)
SP GR UR REFRACTOMETRY: 1.03 (ref 1–1.02)
UROBILINOGEN UR QL STRIP.AUTO: 0.2 EU/DL (ref 0.2–1)
WBC # BLD AUTO: 6.6 K/UL (ref 4.3–11.1)

## 2023-03-08 LAB
ALBUMIN SERPL-MCNC: 3.8 G/DL (ref 3.5–5)
ALBUMIN/GLOB SERPL: 1.4 (ref 0.4–1.6)
ALP SERPL-CCNC: 97 U/L (ref 50–136)
ALT SERPL-CCNC: 35 U/L (ref 12–65)
ANION GAP SERPL CALC-SCNC: 9 MMOL/L (ref 2–11)
AST SERPL-CCNC: 27 U/L (ref 15–37)
BILIRUB SERPL-MCNC: 0.6 MG/DL (ref 0.2–1.1)
BUN SERPL-MCNC: 18 MG/DL (ref 6–23)
CALCIUM SERPL-MCNC: 9.2 MG/DL (ref 8.3–10.4)
CHLORIDE SERPL-SCNC: 108 MMOL/L (ref 101–110)
CHOLEST SERPL-MCNC: 224 MG/DL
CO2 SERPL-SCNC: 23 MMOL/L (ref 21–32)
CREAT SERPL-MCNC: 0.8 MG/DL (ref 0.6–1)
GLOBULIN SER CALC-MCNC: 2.8 G/DL (ref 2.8–4.5)
GLUCOSE SERPL-MCNC: 84 MG/DL (ref 65–100)
HDLC SERPL-MCNC: 85 MG/DL (ref 40–60)
HDLC SERPL: 2.6
LDLC SERPL CALC-MCNC: 108 MG/DL
POTASSIUM SERPL-SCNC: 4.2 MMOL/L (ref 3.5–5.1)
PROT SERPL-MCNC: 6.6 G/DL (ref 6.3–8.2)
SODIUM SERPL-SCNC: 140 MMOL/L (ref 133–143)
TRIGL SERPL-MCNC: 155 MG/DL (ref 35–150)
TSH, 3RD GENERATION: 1 UIU/ML (ref 0.36–3.74)
VLDLC SERPL CALC-MCNC: 31 MG/DL (ref 6–23)

## 2023-03-17 ENCOUNTER — HOSPITAL ENCOUNTER (OUTPATIENT)
Dept: MAMMOGRAPHY | Age: 58
Discharge: HOME | End: 2023-03-17
Payer: COMMERCIAL

## 2023-03-17 DIAGNOSIS — Z01.419 WELL WOMAN EXAM: ICD-10-CM

## 2023-03-17 DIAGNOSIS — Z12.31 SCREENING MAMMOGRAM FOR BREAST CANCER: ICD-10-CM

## 2023-03-17 PROCEDURE — 77063 BREAST TOMOSYNTHESIS BI: CPT

## 2023-04-03 ENCOUNTER — OFFICE VISIT (OUTPATIENT)
Dept: INTERNAL MEDICINE CLINIC | Facility: CLINIC | Age: 58
End: 2023-04-03
Payer: COMMERCIAL

## 2023-04-03 VITALS — WEIGHT: 180 LBS | BODY MASS INDEX: 28.93 KG/M2 | HEIGHT: 66 IN

## 2023-04-03 DIAGNOSIS — R06.83 SNORING: ICD-10-CM

## 2023-04-03 DIAGNOSIS — J30.9 ALLERGIC RHINITIS, UNSPECIFIED SEASONALITY, UNSPECIFIED TRIGGER: ICD-10-CM

## 2023-04-03 DIAGNOSIS — Z82.49 FAMILY HISTORY OF HEART DISEASE: ICD-10-CM

## 2023-04-03 DIAGNOSIS — Z86.010 HISTORY OF COLON POLYPS: ICD-10-CM

## 2023-04-03 DIAGNOSIS — Z78.0 POSTMENOPAUSAL ESTROGEN DEFICIENCY: ICD-10-CM

## 2023-04-03 DIAGNOSIS — G47.00 INSOMNIA, UNSPECIFIED TYPE: ICD-10-CM

## 2023-04-03 DIAGNOSIS — K59.09 CHRONIC CONSTIPATION: ICD-10-CM

## 2023-04-03 DIAGNOSIS — Z00.00 ANNUAL PHYSICAL EXAM: Primary | ICD-10-CM

## 2023-04-03 DIAGNOSIS — N94.9 ADNEXAL FULLNESS: ICD-10-CM

## 2023-04-03 DIAGNOSIS — Z87.891 FORMER SMOKER: ICD-10-CM

## 2023-04-03 DIAGNOSIS — E61.1 IRON DEFICIENCY: ICD-10-CM

## 2023-04-03 DIAGNOSIS — E78.5 DYSLIPIDEMIA: ICD-10-CM

## 2023-04-03 DIAGNOSIS — R40.0 DAYTIME SOMNOLENCE: ICD-10-CM

## 2023-04-03 DIAGNOSIS — R14.0 ABDOMINAL BLOATING: ICD-10-CM

## 2023-04-03 PROCEDURE — 99396 PREV VISIT EST AGE 40-64: CPT | Performed by: NURSE PRACTITIONER

## 2023-04-03 RX ORDER — ZOLPIDEM TARTRATE 5 MG/1
5 TABLET ORAL NIGHTLY PRN
Qty: 30 TABLET | Refills: 1 | Status: SHIPPED | OUTPATIENT
Start: 2023-04-03 | End: 2023-05-03

## 2023-04-03 RX ORDER — FLUTICASONE PROPIONATE 50 MCG
2 SPRAY, SUSPENSION (ML) NASAL DAILY
Qty: 16 G | Refills: 5 | Status: SHIPPED | OUTPATIENT
Start: 2023-04-03

## 2023-04-03 SDOH — ECONOMIC STABILITY: FOOD INSECURITY: WITHIN THE PAST 12 MONTHS, YOU WORRIED THAT YOUR FOOD WOULD RUN OUT BEFORE YOU GOT MONEY TO BUY MORE.: NEVER TRUE

## 2023-04-03 SDOH — ECONOMIC STABILITY: FOOD INSECURITY: WITHIN THE PAST 12 MONTHS, THE FOOD YOU BOUGHT JUST DIDN'T LAST AND YOU DIDN'T HAVE MONEY TO GET MORE.: NEVER TRUE

## 2023-04-03 SDOH — ECONOMIC STABILITY: HOUSING INSECURITY
IN THE LAST 12 MONTHS, WAS THERE A TIME WHEN YOU DID NOT HAVE A STEADY PLACE TO SLEEP OR SLEPT IN A SHELTER (INCLUDING NOW)?: NO

## 2023-04-03 SDOH — ECONOMIC STABILITY: INCOME INSECURITY: HOW HARD IS IT FOR YOU TO PAY FOR THE VERY BASICS LIKE FOOD, HOUSING, MEDICAL CARE, AND HEATING?: NOT HARD AT ALL

## 2023-04-03 ASSESSMENT — ENCOUNTER SYMPTOMS
BLOOD IN STOOL: 0
DIARRHEA: 0
ABDOMINAL PAIN: 0
SHORTNESS OF BREATH: 0
EYE ITCHING: 0
BACK PAIN: 0
COUGH: 0
COLOR CHANGE: 0
ABDOMINAL DISTENTION: 1
NAUSEA: 0
EYE REDNESS: 0
CONSTIPATION: 1

## 2023-04-03 ASSESSMENT — PATIENT HEALTH QUESTIONNAIRE - PHQ9
SUM OF ALL RESPONSES TO PHQ QUESTIONS 1-9: 1
SUM OF ALL RESPONSES TO PHQ QUESTIONS 1-9: 1
SUM OF ALL RESPONSES TO PHQ9 QUESTIONS 1 & 2: 1
SUM OF ALL RESPONSES TO PHQ QUESTIONS 1-9: 1
2. FEELING DOWN, DEPRESSED OR HOPELESS: 0
1. LITTLE INTEREST OR PLEASURE IN DOING THINGS: 1
SUM OF ALL RESPONSES TO PHQ QUESTIONS 1-9: 1

## 2023-04-03 NOTE — PROGRESS NOTES
nervous/anxious. PHYSICAL EXAM    Ht 5' 5.5\" (1.664 m)   Wt 180 lb (81.6 kg)   BMI 29.50 kg/m²      Physical Exam  Vitals and nursing note reviewed. Constitutional:       General: She is not in acute distress. Appearance: Normal appearance. HENT:      Head: Atraumatic. Right Ear: Tympanic membrane and ear canal normal.      Left Ear: Tympanic membrane and ear canal normal.      Nose: Nose normal.   Eyes:      Conjunctiva/sclera: Conjunctivae normal.   Neck:      Thyroid: No thyroid mass, thyromegaly or thyroid tenderness. Cardiovascular:      Rate and Rhythm: Normal rate and regular rhythm. Pulses:           Radial pulses are 2+ on the right side and 2+ on the left side. Dorsalis pedis pulses are 2+ on the right side and 2+ on the left side. Posterior tibial pulses are 2+ on the right side and 2+ on the left side. Pulmonary:      Effort: Pulmonary effort is normal.      Breath sounds: Normal breath sounds. Abdominal:      Palpations: Abdomen is soft. There is no hepatomegaly, splenomegaly or mass. Tenderness: There is no abdominal tenderness. Genitourinary:     Adnexa: Right adnexa normal.        Left: Fullness present. No mass or tenderness. Rectum: Guaiac result negative. Musculoskeletal:      Cervical back: Normal range of motion. Right lower leg: No edema. Left lower leg: No edema. Feet:      Right foot:      Skin integrity: Skin integrity normal.      Toenail Condition: Right toenails are normal.      Left foot:      Skin integrity: Skin integrity normal.      Toenail Condition: Left toenails are normal.   Lymphadenopathy:      Cervical: No cervical adenopathy. Skin:     Findings: No rash. Comments: Multiple nevi     Neurological:      General: No focal deficit present. Mental Status: She is alert and oriented to person, place, and time. Motor: Motor function is intact.       Gait: Gait normal.   Psychiatric:

## 2023-04-27 ENCOUNTER — HOSPITAL ENCOUNTER (OUTPATIENT)
Dept: ULTRASOUND IMAGING | Age: 58
Discharge: HOME | End: 2023-04-27
Payer: COMMERCIAL

## 2023-04-27 DIAGNOSIS — R14.0 ABDOMINAL BLOATING: ICD-10-CM

## 2023-04-27 DIAGNOSIS — N94.9 ADNEXAL FULLNESS: ICD-10-CM

## 2023-04-27 PROCEDURE — 76856 US EXAM PELVIC COMPLETE: CPT

## 2023-05-02 ENCOUNTER — OFFICE VISIT (OUTPATIENT)
Dept: CARDIOLOGY CLINIC | Age: 58
End: 2023-05-02
Payer: COMMERCIAL

## 2023-05-02 VITALS
DIASTOLIC BLOOD PRESSURE: 68 MMHG | SYSTOLIC BLOOD PRESSURE: 132 MMHG | WEIGHT: 181 LBS | HEIGHT: 65 IN | HEART RATE: 61 BPM | BODY MASS INDEX: 30.16 KG/M2

## 2023-05-02 DIAGNOSIS — I49.3 VENTRICULAR PREMATURE DEPOLARIZATION: ICD-10-CM

## 2023-05-02 DIAGNOSIS — I49.3 PVC (PREMATURE VENTRICULAR CONTRACTION): Primary | ICD-10-CM

## 2023-05-02 PROCEDURE — 99214 OFFICE O/P EST MOD 30 MIN: CPT | Performed by: INTERNAL MEDICINE

## 2023-05-02 PROCEDURE — 93000 ELECTROCARDIOGRAM COMPLETE: CPT | Performed by: INTERNAL MEDICINE

## 2023-05-02 RX ORDER — FERROUS SULFATE 325(65) MG
TABLET ORAL
COMMUNITY
Start: 2023-03-28

## 2023-05-02 RX ORDER — NEBIVOLOL 5 MG/1
5 TABLET ORAL DAILY
Qty: 90 TABLET | Refills: 3 | Status: SHIPPED | OUTPATIENT
Start: 2023-05-02

## 2023-05-02 RX ORDER — HYDROCORTISONE ACETATE 25 MG/1
SUPPOSITORY RECTAL
COMMUNITY
Start: 2023-04-26

## 2023-05-02 NOTE — PROGRESS NOTES
[address] [place]  [address], [address] [employer] [facility], [address] [place] [address]  PHONE: [phone]    SUBJECTIVE: /HPI  Jame Boateng (1965) is a 62 y.o. female seen for a follow up visit regarding the following:   Specialty Problems          Cardiology Problems    PVC (premature ventricular contraction)        Internal hemorrhoid         Pt doing well. No chest pain. No palpitations. Patient denies syncope. No dyspnea. States they are taking meds. Maintains a normal level of activity for them without symptoms. No dizziness or lightheadedness. All above conditions stable. Bystolic working well for PVCs      Past Medical History, Past Surgical History, Family history, Social History, and Medications were all reviewed with the patient today and updated as necessary.     No Known Allergies  Past Medical History:   Diagnosis Date    Abnormal Pap smear     Arrhythmia     wpw-- in college--- no meds -- no problems--- no symptoms    Colon polyp 2014    Benign    Constipation     Menopause     Panic disorder     Pelvic pain in female 6/17/2014    Rectal bleeding x 6 months    Routine gynecological examination 6/17/2014    Screening for malignant neoplasm of the rectum 6/17/2014    Snores     never been tested for sleep apnea     Past Surgical History:   Procedure Laterality Date    BREAST SURGERY  age 27--     then removed 2013    COLONOSCOPY  2014    Polyp- TVA    COLONOSCOPY  06/01/2018    Internal hemorrhoid    COLONOSCOPY N/A 6/1/2018    COLONOSCOPY/ 26 performed by Kevin Holbrook MD at [facility] ENDOSCOPY    HYSTERECTOMY, TOTAL ABDOMINAL (CERVIX REMOVED)  2008    IMPLANT BREAST SILICONE/EQ Bilateral     Removed - 10 years ago    OTHER SURGICAL HISTORY  2019    Tooth Implant    UPPER GASTROINTESTINAL ENDOSCOPY  06/01/2018     Family History   Problem Relation Age of Onset    Anemia Mother         pernicious    Thyroid Disease Mother     Cataracts Mother     Diabetes Mother         Type 2    Heart Disease

## 2023-05-26 ENCOUNTER — TELEPHONE (OUTPATIENT)
Dept: OBGYN CLINIC | Age: 58
End: 2023-05-26

## 2023-05-26 RX ORDER — ESTRADIOL 0.1 MG/D
1 FILM, EXTENDED RELEASE TRANSDERMAL
Qty: 8 PATCH | Refills: 1 | Status: SHIPPED | OUTPATIENT
Start: 2023-05-29

## 2023-05-26 RX ORDER — ESTRADIOL 0.1 MG/D
PATCH, EXTENDED RELEASE TRANSDERMAL
Qty: 24 PATCH | Refills: 3 | OUTPATIENT
Start: 2023-05-26

## 2023-05-26 NOTE — TELEPHONE ENCOUNTER
Spoke with patient regarding pharmacy medication request and patient notified per office policy we cannot accept refill requests from pharmacy and they must come from the patient. Pt was scheduled for [employer] this week but had to cancel due to family illness. 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 Pt given 2 month supply of meds to cover until appointment time.

## 2023-06-22 NOTE — PROGRESS NOTES
HPI:  Ms. Cyndi Roberts is a 62 y.o.   OB History          2    Para   2    Term   2            AB        Living   2         SAB        IAB        Ectopic        Molar        Multiple        Live Births   2             who is here today for a well woman exam. She complains of hair falling out. Dermatology ordered iron levels and per patient the level was at 16 took iron and went up to 68 but back down to 32. Needs refills on Minivelle. Wants to discuss how long she needs to be on the hormone patches. Thinks her patch came off because last night when she was in the bathtub and broke out in a hot flash. She put a new patch on this morning. PCP order a ultrasound about a month ago for bloating, constipation. U/S findings from 23: Uterus/Cervix: Status post hysterectomy. Right Ovary/Adnexa: Not visualized due to shadowing bowel gas. Left Ovary/Adnexa: Not visualized due to shadowing bowel gas. Free Fluid: None. Nutrafol-  and growth serum    Would like to try lower dose   Date Performed Result   PAP 2019  Negative. HPV Negative. Performed by Yina Urbina. History of hysterectomy in . Mammogram 2023  Scattered fibroglandular density; BD2   Colonoscopy 2023  Normal mucosa in the terminal ileum and cecum. Polyp 3 mm in the sigmoid colon. Internal hemorrhoids. Repeat in 5 year. Dexa NA NA       GYN History         No LMP recorded. Patient has had a hysterectomy.    Past Medical History:  Past Medical History:   Diagnosis Date    Abnormal Pap smear     Arrhythmia     wpw-- in college--- no meds -- no problems--- no symptoms    Colon polyp     Benign    Constipation     Menopause     Panic disorder     Pelvic pain in female 2014    Rectal bleeding x 6 months    Routine gynecological examination 2014    Screening for malignant neoplasm of the rectum 2014    Snores     never been tested for sleep apnea       Past Surgical History:  Past Surgical

## 2023-06-23 ENCOUNTER — OFFICE VISIT (OUTPATIENT)
Dept: OBGYN CLINIC | Age: 58
End: 2023-06-23
Payer: COMMERCIAL

## 2023-06-23 VITALS
WEIGHT: 178.4 LBS | HEIGHT: 65 IN | SYSTOLIC BLOOD PRESSURE: 124 MMHG | BODY MASS INDEX: 29.72 KG/M2 | DIASTOLIC BLOOD PRESSURE: 70 MMHG

## 2023-06-23 DIAGNOSIS — Z13.89 SCREENING FOR GENITOURINARY CONDITION: ICD-10-CM

## 2023-06-23 DIAGNOSIS — Z79.890 HORMONE REPLACEMENT THERAPY (HRT): ICD-10-CM

## 2023-06-23 DIAGNOSIS — Z01.419 WELL WOMAN EXAM: Primary | ICD-10-CM

## 2023-06-23 DIAGNOSIS — Z12.31 ENCOUNTER FOR SCREENING MAMMOGRAM FOR BREAST CANCER: ICD-10-CM

## 2023-06-23 LAB
BILIRUBIN, URINE, POC: NORMAL
BLOOD URINE, POC: NEGATIVE
GLUCOSE URINE, POC: NEGATIVE
KETONES, URINE, POC: NORMAL
LEUKOCYTE ESTERASE, URINE, POC: NEGATIVE
NITRITE, URINE, POC: NEGATIVE
PH, URINE, POC: 5.5 (ref 4.6–8)
PROTEIN,URINE, POC: NEGATIVE
SPECIFIC GRAVITY, URINE, POC: 1.02 (ref 1–1.03)
URINALYSIS CLARITY, POC: CLEAR
URINALYSIS COLOR, POC: YELLOW
UROBILINOGEN, POC: NORMAL

## 2023-06-23 PROCEDURE — 99396 PREV VISIT EST AGE 40-64: CPT | Performed by: OBSTETRICS & GYNECOLOGY

## 2023-06-23 PROCEDURE — 81002 URINALYSIS NONAUTO W/O SCOPE: CPT | Performed by: OBSTETRICS & GYNECOLOGY

## 2023-06-23 RX ORDER — ESTRADIOL 0.1 MG/D
1 FILM, EXTENDED RELEASE TRANSDERMAL
Qty: 8 PATCH | Refills: 11 | Status: SHIPPED | OUTPATIENT
Start: 2023-06-26

## 2024-03-15 ENCOUNTER — TELEPHONE (OUTPATIENT)
Dept: INTERNAL MEDICINE CLINIC | Facility: CLINIC | Age: 59
End: 2024-03-15

## 2024-03-15 DIAGNOSIS — Z00.00 ROUTINE HEALTH MAINTENANCE: Primary | ICD-10-CM

## 2024-03-15 NOTE — TELEPHONE ENCOUNTER
----- Message from Annita Schultz sent at 3/14/2024 10:08 AM EDT -----  Subject: Referral Request    Reason for referral request? Blood work   Provider patient wants to be referred to(if known):     Provider Phone Number(if known):    Additional Information for Provider? Patient needs orders for blood work   placed to complete prior to physical on 4/15  ---------------------------------------------------------------------------  --------------  CALL BACK INFO    [phone]; OK to leave message on voicemail  ---------------------------------------------------------------------------  --------------

## 2024-03-18 ENCOUNTER — HOSPITAL ENCOUNTER (OUTPATIENT)
Dept: MAMMOGRAPHY | Age: 59
Discharge: HOME | End: 2024-03-21
Payer: COMMERCIAL

## 2024-03-18 DIAGNOSIS — Z12.31 ENCOUNTER FOR SCREENING MAMMOGRAM FOR BREAST CANCER: ICD-10-CM

## 2024-03-18 PROCEDURE — 77063 BREAST TOMOSYNTHESIS BI: CPT

## 2024-04-01 ENCOUNTER — PATIENT MESSAGE (OUTPATIENT)
Dept: INTERNAL MEDICINE CLINIC | Facility: CLINIC | Age: 59
End: 2024-04-01

## 2024-04-01 DIAGNOSIS — E61.1 IRON DEFICIENCY: Primary | ICD-10-CM

## 2024-04-02 ENCOUNTER — TELEPHONE (OUTPATIENT)
Dept: INTERNAL MEDICINE CLINIC | Facility: CLINIC | Age: 59
End: 2024-04-02

## 2024-04-02 NOTE — TELEPHONE ENCOUNTER
Please advise. Pt is requesting ferritin level be added to her labs for her next office visit. Lab is pended for you to sign.

## 2024-04-02 NOTE — TELEPHONE ENCOUNTER
From: Rosario Caceres  To: Fozia Paiz  Sent: 4/1/2024 1:52 PM EDT  Subject: Lab work    Can you please add a ferritin level to my lab work orders? I’m planning on getting them drawn this Thursday 4/4. Thank you so much

## 2024-04-05 DIAGNOSIS — Z00.00 ROUTINE HEALTH MAINTENANCE: ICD-10-CM

## 2024-04-05 LAB
ALBUMIN SERPL-MCNC: 4 G/DL (ref 3.5–5)
ALBUMIN/GLOB SERPL: 1.1 (ref 0.4–1.6)
ALP SERPL-CCNC: 91 U/L (ref 50–136)
ALT SERPL-CCNC: 36 U/L (ref 12–65)
ANION GAP SERPL CALC-SCNC: 4 MMOL/L (ref 2–11)
AST SERPL-CCNC: 30 U/L (ref 15–37)
BACTERIA URNS QL MICRO: ABNORMAL /HPF
BASOPHILS # BLD: 0 K/UL (ref 0–0.2)
BASOPHILS NFR BLD: 0 % (ref 0–2)
BILIRUB SERPL-MCNC: 0.2 MG/DL (ref 0.2–1.1)
BUN SERPL-MCNC: 16 MG/DL (ref 6–23)
CALCIUM SERPL-MCNC: 9.8 MG/DL (ref 8.3–10.4)
CASTS URNS QL MICRO: ABNORMAL /LPF
CHLORIDE SERPL-SCNC: 103 MMOL/L (ref 103–113)
CHOLEST SERPL-MCNC: 185 MG/DL
CO2 SERPL-SCNC: 29 MMOL/L (ref 21–32)
CREAT SERPL-MCNC: 1.1 MG/DL (ref 0.6–1)
CRYSTALS URNS QL MICRO: 0 /LPF
DIFFERENTIAL METHOD BLD: ABNORMAL
EOSINOPHIL # BLD: 0.1 K/UL (ref 0–0.8)
EOSINOPHIL NFR BLD: 3 % (ref 0.5–7.8)
EPI CELLS #/AREA URNS HPF: ABNORMAL /HPF
ERYTHROCYTE [DISTWIDTH] IN BLOOD BY AUTOMATED COUNT: 13.6 % (ref 11.9–14.6)
EST. AVERAGE GLUCOSE BLD GHB EST-MCNC: 97 MG/DL
GLOBULIN SER CALC-MCNC: 3.5 G/DL (ref 2.8–4.5)
GLUCOSE SERPL-MCNC: 94 MG/DL (ref 65–100)
HBA1C MFR BLD: 5 % (ref 4.8–5.6)
HCT VFR BLD AUTO: 46.7 % (ref 35.8–46.3)
HDLC SERPL-MCNC: 71 MG/DL (ref 40–60)
HDLC SERPL: 2.6
HGB BLD-MCNC: 14.7 G/DL (ref 11.7–15.4)
IMM GRANULOCYTES # BLD AUTO: 0 K/UL (ref 0–0.5)
IMM GRANULOCYTES NFR BLD AUTO: 0 % (ref 0–5)
LDLC SERPL CALC-MCNC: 94.6 MG/DL
LYMPHOCYTES # BLD: 1.7 K/UL (ref 0.5–4.6)
LYMPHOCYTES NFR BLD: 43 % (ref 13–44)
MCH RBC QN AUTO: 28.7 PG (ref 26.1–32.9)
MCHC RBC AUTO-ENTMCNC: 31.5 G/DL (ref 31.4–35)
MCV RBC AUTO: 91 FL (ref 82–102)
MONOCYTES # BLD: 0.4 K/UL (ref 0.1–1.3)
MONOCYTES NFR BLD: 11 % (ref 4–12)
MUCOUS THREADS URNS QL MICRO: ABNORMAL /LPF
NEUTS SEG # BLD: 1.7 K/UL (ref 1.7–8.2)
NEUTS SEG NFR BLD: 43 % (ref 43–78)
NRBC # BLD: 0 K/UL (ref 0–0.2)
OTHER OBSERVATIONS: ABNORMAL
PLATELET # BLD AUTO: 209 K/UL (ref 150–450)
PMV BLD AUTO: 10.8 FL (ref 9.4–12.3)
POTASSIUM SERPL-SCNC: 3.7 MMOL/L (ref 3.5–5.1)
PROT SERPL-MCNC: 7.5 G/DL (ref 6.3–8.2)
RBC # BLD AUTO: 5.13 M/UL (ref 4.05–5.2)
RBC #/AREA URNS HPF: ABNORMAL /HPF
SODIUM SERPL-SCNC: 136 MMOL/L (ref 136–146)
TRIGL SERPL-MCNC: 97 MG/DL (ref 35–150)
TSH, 3RD GENERATION: 2.1 UIU/ML (ref 0.36–3.74)
VLDLC SERPL CALC-MCNC: 19.4 MG/DL (ref 6–23)
WBC # BLD AUTO: 3.9 K/UL (ref 4.3–11.1)
WBC URNS QL MICRO: ABNORMAL /HPF

## 2024-04-12 DIAGNOSIS — E61.1 IRON DEFICIENCY: ICD-10-CM

## 2024-04-12 LAB — FERRITIN SERPL-MCNC: 18 NG/ML (ref 8–388)

## 2024-04-14 PROBLEM — D36.9 TUBULOVILLOUS ADENOMA: Status: ACTIVE | Noted: 2024-04-14

## 2024-04-14 PROBLEM — J30.9 ALLERGIC RHINITIS: Status: ACTIVE | Noted: 2024-04-14

## 2024-04-14 NOTE — PROGRESS NOTES
PROGRESS NOTE      Chief Complaint   Patient presents with    Annual Exam       HPI    CPE: Mammogram due 4/2025. Colonoscopy due 2028. Had HBV vaccines. Due for Shingrix and Covid booster. Annual derm visit - Dr Castro. GYN care Dr Maddox     Allergic rhinitis: Saline rinse. Claritin.      Multiple nevi: Dr Castro annual check.     Tobacco: Former social smoker     Insomnia: difficulty falling asleep. Ambien prn but not helpful     Chronic constipation: Linzess 72mg - taking intermittently due to diarrhea as side effect.  Feels bloated, especially after eating certain foods (bloating much improved with intermittent fasting and decrease processed foods).  Colonoscopy 2023-5-year recall.  Transvaginal ultrasound last year without significant abnormality.     History of tubuolovillous adenoma 2014. Colonoscopy 2023 - 5 year recall     Panic disorder: Lamictal with clonazepam prn. Per Dr Hightower - sees annually.  Increased anxiety recently with increased situational stressors (caring for her aging parents)     Fatigue and daytime somnolence. Discussed last year: snoring, unrefreshed by sleep. Referred to sleep medicine but did not hear back from them..     Iron deficiency: Discussed alopecia with Dr Castro. Low ferritin - 18 on recent check.  Alopecia has improved.  Hemoglobin 14.  Ongoing fatigue.     Mixed hyperlipidemia: Father with ASCVD. Coronary calcium score 0 2023    PVCs:Bystolic 5 mg    Weight gain: Frustrated with recent weight gain.  Busy caring for her parents, all working, and trying to spend time with her grandkids.  Less attention to healthy food choices and exercise.    Elevated creatinine: No history of CKD.  GI bug just prior to labs.                Past Medical History, Past Surgical History, Family history, Social History, and Medications were all reviewed and updated as necessary.     Current Outpatient Medications   Medication Sig Dispense Refill    cetirizine (ZYRTEC) 10 MG tablet Take 1 tablet by

## 2024-04-15 ENCOUNTER — OFFICE VISIT (OUTPATIENT)
Dept: INTERNAL MEDICINE CLINIC | Facility: CLINIC | Age: 59
End: 2024-04-15
Payer: COMMERCIAL

## 2024-04-15 VITALS
BODY MASS INDEX: 31.02 KG/M2 | SYSTOLIC BLOOD PRESSURE: 134 MMHG | HEIGHT: 65 IN | DIASTOLIC BLOOD PRESSURE: 68 MMHG | WEIGHT: 186.2 LBS

## 2024-04-15 DIAGNOSIS — E66.9 OBESITY (BMI 30-39.9): ICD-10-CM

## 2024-04-15 DIAGNOSIS — Z87.891 FORMER SMOKER: ICD-10-CM

## 2024-04-15 DIAGNOSIS — F41.0 PANIC DISORDER: ICD-10-CM

## 2024-04-15 DIAGNOSIS — G47.00 INSOMNIA, UNSPECIFIED TYPE: ICD-10-CM

## 2024-04-15 DIAGNOSIS — Z82.49 FAMILY HISTORY OF HEART DISEASE: ICD-10-CM

## 2024-04-15 DIAGNOSIS — K64.4 EXTERNAL HEMORRHOID: ICD-10-CM

## 2024-04-15 DIAGNOSIS — Z00.00 ANNUAL PHYSICAL EXAM: Primary | ICD-10-CM

## 2024-04-15 DIAGNOSIS — R79.89 ELEVATED SERUM CREATININE: ICD-10-CM

## 2024-04-15 DIAGNOSIS — D36.9 TUBULOVILLOUS ADENOMA: ICD-10-CM

## 2024-04-15 DIAGNOSIS — Z78.0 POSTMENOPAUSAL ESTROGEN DEFICIENCY: ICD-10-CM

## 2024-04-15 DIAGNOSIS — K59.09 CHRONIC CONSTIPATION: ICD-10-CM

## 2024-04-15 DIAGNOSIS — E61.1 IRON DEFICIENCY: ICD-10-CM

## 2024-04-15 DIAGNOSIS — J30.9 ALLERGIC RHINITIS, UNSPECIFIED SEASONALITY, UNSPECIFIED TRIGGER: ICD-10-CM

## 2024-04-15 DIAGNOSIS — R63.5 WEIGHT GAIN: ICD-10-CM

## 2024-04-15 PROCEDURE — 99396 PREV VISIT EST AGE 40-64: CPT | Performed by: NURSE PRACTITIONER

## 2024-04-15 RX ORDER — CETIRIZINE HYDROCHLORIDE 10 MG/1
10 TABLET ORAL DAILY
COMMUNITY

## 2024-04-15 RX ORDER — HYDROCORTISONE ACETATE 25 MG/1
SUPPOSITORY RECTAL
Qty: 24 SUPPOSITORY | Refills: 3 | Status: SHIPPED | OUTPATIENT
Start: 2024-04-15

## 2024-04-15 SDOH — ECONOMIC STABILITY: FOOD INSECURITY: WITHIN THE PAST 12 MONTHS, YOU WORRIED THAT YOUR FOOD WOULD RUN OUT BEFORE YOU GOT MONEY TO BUY MORE.: NEVER TRUE

## 2024-04-15 SDOH — ECONOMIC STABILITY: INCOME INSECURITY: HOW HARD IS IT FOR YOU TO PAY FOR THE VERY BASICS LIKE FOOD, HOUSING, MEDICAL CARE, AND HEATING?: NOT HARD AT ALL

## 2024-04-15 SDOH — ECONOMIC STABILITY: FOOD INSECURITY: WITHIN THE PAST 12 MONTHS, THE FOOD YOU BOUGHT JUST DIDN'T LAST AND YOU DIDN'T HAVE MONEY TO GET MORE.: NEVER TRUE

## 2024-04-15 ASSESSMENT — PATIENT HEALTH QUESTIONNAIRE - PHQ9
SUM OF ALL RESPONSES TO PHQ QUESTIONS 1-9: 2
SUM OF ALL RESPONSES TO PHQ9 QUESTIONS 1 & 2: 2
SUM OF ALL RESPONSES TO PHQ QUESTIONS 1-9: 2
SUM OF ALL RESPONSES TO PHQ9 QUESTIONS 1 & 2: 2
1. LITTLE INTEREST OR PLEASURE IN DOING THINGS: SEVERAL DAYS
2. FEELING DOWN, DEPRESSED OR HOPELESS: SEVERAL DAYS
SUM OF ALL RESPONSES TO PHQ QUESTIONS 1-9: 2
2. FEELING DOWN, DEPRESSED OR HOPELESS: SEVERAL DAYS
SUM OF ALL RESPONSES TO PHQ QUESTIONS 1-9: 2
1. LITTLE INTEREST OR PLEASURE IN DOING THINGS: SEVERAL DAYS

## 2024-04-15 ASSESSMENT — ENCOUNTER SYMPTOMS
EYE ITCHING: 0
COLOR CHANGE: 0
SHORTNESS OF BREATH: 0
BACK PAIN: 1
ABDOMINAL DISTENTION: 1
BLOOD IN STOOL: 0
ABDOMINAL PAIN: 0
COUGH: 0
NAUSEA: 0
CONSTIPATION: 1
DIARRHEA: 0
EYE REDNESS: 0

## 2024-04-16 ENCOUNTER — NURSE TRIAGE (OUTPATIENT)
Dept: OTHER | Facility: CLINIC | Age: 59
End: 2024-04-16

## 2024-04-16 NOTE — TELEPHONE ENCOUNTER
Location of patient: [place]    Location of employment: [employer]    Department where injury occurred: Surgery     Location of injury (body part involved): Low back     Time of injury: 4/15/24    Last 4 of SSN: [id number]    Location associate referred to for care: [facility]     Subjective: Caller states \"back injury\"     Current Symptoms:   - moving surgical trays and felt back pain; today unable to stand up; had to call into work   - denies numbness / tingling   - hurts to sit down on toilet     Pain Severity: \"excruciating pain\"     Temperature: no fever     What has been tried: flexeril 10 mg     Recommended disposition: go to office now; gave [facility] address and phone number     Care advice provided, caller verbalizes understanding; denies any other questions or concerns.    Reason for Disposition   SEVERE back pain (e.g., excruciating, unable to do any normal activities) and not improved after pain medicine and CARE ADVICE    Protocols used: Back Injury-ADULT-[place]

## 2024-04-19 ENCOUNTER — HOSPITAL ENCOUNTER (OUTPATIENT)
Dept: MAMMOGRAPHY | Age: 59
End: 2024-04-19
Attending: OBSTETRICS & GYNECOLOGY
Payer: COMMERCIAL

## 2024-04-19 ENCOUNTER — HOSPITAL ENCOUNTER (OUTPATIENT)
Dept: MAMMOGRAPHY | Age: 59
Discharge: HOME | End: 2024-04-19
Attending: OBSTETRICS & GYNECOLOGY
Payer: COMMERCIAL

## 2024-04-19 ENCOUNTER — PATIENT MESSAGE (OUTPATIENT)
Dept: INTERNAL MEDICINE CLINIC | Facility: CLINIC | Age: 59
End: 2024-04-19

## 2024-04-19 DIAGNOSIS — R92.8 ABNORMAL MAMMOGRAM: ICD-10-CM

## 2024-04-19 DIAGNOSIS — E61.1 IRON DEFICIENCY: Primary | ICD-10-CM

## 2024-04-19 PROCEDURE — 76642 ULTRASOUND BREAST LIMITED: CPT

## 2024-04-19 PROCEDURE — G0279 TOMOSYNTHESIS, MAMMO: HCPCS

## 2024-04-22 RX ORDER — FERROUS SULFATE 325(65) MG
1 TABLET ORAL DAILY
Qty: 90 TABLET | Refills: 3 | Status: SHIPPED | OUTPATIENT
Start: 2024-04-22

## 2024-04-22 NOTE — TELEPHONE ENCOUNTER
From: Rosario Caceres  To: Fozia Paiz  Sent: 4/19/2024 12:30 PM EDT  Subject: Refill scripts    I was just wondering if [facility]? Thanks hate to bother you

## 2024-05-03 ENCOUNTER — OFFICE VISIT (OUTPATIENT)
Age: 59
End: 2024-05-03
Payer: COMMERCIAL

## 2024-05-03 VITALS
DIASTOLIC BLOOD PRESSURE: 82 MMHG | SYSTOLIC BLOOD PRESSURE: 130 MMHG | WEIGHT: 191.5 LBS | HEIGHT: 65 IN | HEART RATE: 80 BPM | BODY MASS INDEX: 31.9 KG/M2

## 2024-05-03 DIAGNOSIS — I49.3 PVC (PREMATURE VENTRICULAR CONTRACTION): Primary | ICD-10-CM

## 2024-05-03 PROCEDURE — 99214 OFFICE O/P EST MOD 30 MIN: CPT | Performed by: INTERNAL MEDICINE

## 2024-05-03 RX ORDER — NEBIVOLOL 5 MG/1
5 TABLET ORAL DAILY
Qty: 90 TABLET | Refills: 3 | Status: SHIPPED | OUTPATIENT
Start: 2024-05-03

## 2024-05-03 NOTE — PROGRESS NOTES
[facility] CARDIOLOGY, [address], [address]  [place] [address]  PHONE: [phone]    SUBJECTIVE: /HPI  Rosario Caceres (1965) is a 58 y.o. female seen for a follow up visit regarding the following:   Specialty Problems          Cardiology Problems    PVC (premature ventricular contraction)        Internal hemorrhoid         Pt doing well. No chest pain. No palpitations. Patient denies syncope. No dyspnea. States they are taking meds. Maintains a normal level of activity for them without symptoms. No dizziness or lightheadedness. All above conditions stable.      Past Medical History, Past Surgical History, Family history, Social History, and Medications were all reviewed with the patient today and updated as necessary.    No Known Allergies  Past Medical History:   Diagnosis Date    Abnormal Pap smear     Arrhythmia     wpw-- in college--- no meds -- no problems--- no symptoms    Colon polyp 2014    Benign    Constipation     Menopause     Panic disorder     Pelvic pain in female 6/17/2014    Rectal bleeding x 6 months    Routine gynecological examination 6/17/2014    Screening for malignant neoplasm of the rectum 6/17/2014    Snores     never been tested for sleep apnea     Past Surgical History:   Procedure Laterality Date    BREAST SURGERY  age 30--     then removed 2013    COLONOSCOPY  2014    Polyp- TVA    COLONOSCOPY N/A 6/1/2018    COLONOSCOPY/ 26 performed by SANDRA Rai MD at [facility] ENDOSCOPY    HYSTERECTOMY, TOTAL ABDOMINAL (CERVIX REMOVED)  2008    IMPLANT BREAST SILICONE/EQ Bilateral     Removed - 10 years ago    OTHER SURGICAL HISTORY  2019    Tooth Implant    UPPER GASTROINTESTINAL ENDOSCOPY  06/01/2018     Family History   Problem Relation Age of Onset    Anemia Mother         pernicious    Thyroid Disease Mother     Cataracts Mother     Diabetes Mother         Type 2    Heart Disease Father     Coronary Art Dis Father     Cataracts Father     Cancer Brother         CLL-

## 2024-05-20 DIAGNOSIS — Z79.890 HORMONE REPLACEMENT THERAPY (HRT): ICD-10-CM

## 2024-05-20 RX ORDER — ESTRADIOL 0.1 MG/D
PATCH, EXTENDED RELEASE TRANSDERMAL
Qty: 8 PATCH | Refills: 11 | OUTPATIENT
Start: 2024-05-20

## 2024-05-21 ENCOUNTER — NURSE ONLY (OUTPATIENT)
Dept: FAMILY MEDICINE CLINIC | Facility: CLINIC | Age: 59
End: 2024-05-21
Payer: COMMERCIAL

## 2024-05-21 VITALS
BODY MASS INDEX: 30.49 KG/M2 | OXYGEN SATURATION: 97 % | TEMPERATURE: 97.9 F | HEART RATE: 74 BPM | HEIGHT: 65 IN | DIASTOLIC BLOOD PRESSURE: 81 MMHG | WEIGHT: 183 LBS | SYSTOLIC BLOOD PRESSURE: 125 MMHG

## 2024-05-21 DIAGNOSIS — H66.006 RECURRENT ACUTE SUPPURATIVE OTITIS MEDIA WITHOUT SPONTANEOUS RUPTURE OF TYMPANIC MEMBRANE OF BOTH SIDES: Primary | ICD-10-CM

## 2024-05-21 PROCEDURE — 99213 OFFICE O/P EST LOW 20 MIN: CPT | Performed by: FAMILY MEDICINE

## 2024-05-21 RX ORDER — CEFDINIR 300 MG/1
300 CAPSULE ORAL 2 TIMES DAILY
Qty: 20 CAPSULE | Refills: 0 | Status: SHIPPED | OUTPATIENT
Start: 2024-05-21 | End: 2024-05-31

## 2024-05-21 ASSESSMENT — ENCOUNTER SYMPTOMS
SHORTNESS OF BREATH: 0
SORE THROAT: 1
COUGH: 1

## 2024-05-21 NOTE — PROGRESS NOTES
is opaque and red     Nose: No congestion or rhinorrhea.      Mouth/Throat:      Pharynx: No oropharyngeal exudate or posterior oropharyngeal erythema.   Eyes:      Conjunctiva/sclera: Conjunctivae normal.   Pulmonary:      Effort: Pulmonary effort is normal.   Neurological:      Mental Status: She is alert.          1. Recurrent acute suppurative otitis media without spontaneous rupture of tympanic membrane of both sides  Recurrent ear symptoms for past three weeks.  Has been on antibiotics, oral steroid, nasal steroids, and second generation antihistamine without improvement in symptoms.  Left TM is red and opaque.  Try cefdinir.  Referral to ENT for persistent symptoms.  Right TM opaque but no longer looks in infected. -     cefdinir (OMNICEF) 300 MG capsule; Take 1 capsule by mouth 2 times daily for 10 days, Disp-20 capsule, R-0Normal  -     [facility]       Patient informed, we will call with blood work results within one week.  If you have not heard regarding results in over a week, please contact office.  You can also review results on mychart.           Waldemar Hightower MD

## 2024-06-25 NOTE — PROGRESS NOTES
OTHER SURGICAL HISTORY  2019    Tooth Implant    UPPER GASTROINTESTINAL ENDOSCOPY  2018       Allergies:   No Known Allergies    Medication History:  Current Outpatient Medications   Medication Sig Dispense Refill    nebivolol (BYSTOLIC) 5 MG tablet Take 1 tablet by mouth daily 90 tablet 3    cetirizine (ZYRTEC) 10 MG tablet Take 1 tablet by mouth daily      hydrocortisone (ANUSOL-HC) 25 MG suppository INSERT 1 SUPPOSSITORY RECTALLY TWICE A DAY FOR 10 DAYS 24 suppository 3    linaCLOtide (LINZESS) 72 MCG CAPS capsule Take 1 capsule by mouth every morning (before breakfast) 90 capsule 3    estradiol (MINIVELLE) 0.1 MG/24HR Place 1 patch onto the skin Twice a Week 8 patch 11    fluticasone (FLONASE) 50 MCG/ACT nasal spray 2 sprays by Each Nostril route daily 16 g 5    clonazePAM (KLONOPIN) 0.5 MG tablet Take 2 tablets by mouth 3 times daily as needed.      lamoTRIgine (LAMICTAL) 25 MG tablet Take 2 tablets by mouth 2 times daily      ferrous sulfate (IRON 325) 325 (65 Fe) MG tablet Take 1 tablet by mouth daily (Patient not taking: Reported on 2024) 90 tablet 3     No current facility-administered medications for this visit.       Social History:  Social History     Socioeconomic History    Marital status:      Spouse name: Not on file    Number of children: Not on file    Years of education: Not on file    Highest education level: Not on file   Occupational History    Not on file   Tobacco Use    Smoking status: Former     Current packs/day: 0.00     Types: Cigarettes     Quit date: 2022     Years since quittin.9    Smokeless tobacco: Never    Tobacco comments:     Quit smoking: Socially   Vaping Use    Vaping Use: Never used   Substance and Sexual Activity    Alcohol use: Yes     Alcohol/week: 1.0 standard drink of alcohol     Types: 1 Shots of liquor per week    Drug use: No    Sexual activity: Yes     Partners: Male     Birth control/protection: Surgical     Comment: Hysterectomy   Other

## 2024-06-26 ENCOUNTER — OFFICE VISIT (OUTPATIENT)
Dept: OBGYN CLINIC | Age: 59
End: 2024-06-26
Payer: COMMERCIAL

## 2024-06-26 VITALS
BODY MASS INDEX: 30.39 KG/M2 | SYSTOLIC BLOOD PRESSURE: 116 MMHG | HEIGHT: 65 IN | DIASTOLIC BLOOD PRESSURE: 70 MMHG | WEIGHT: 182.4 LBS

## 2024-06-26 DIAGNOSIS — Z01.419 WELL WOMAN EXAM: Primary | ICD-10-CM

## 2024-06-26 DIAGNOSIS — R92.8 FOLLOW-UP EXAMINATION OF ABNORMAL MAMMOGRAM: ICD-10-CM

## 2024-06-26 DIAGNOSIS — Z13.89 SCREENING FOR GENITOURINARY CONDITION: ICD-10-CM

## 2024-06-26 DIAGNOSIS — Z79.890 HORMONE REPLACEMENT THERAPY (HRT): ICD-10-CM

## 2024-06-26 LAB
BILIRUBIN, URINE, POC: NEGATIVE
BLOOD URINE, POC: NEGATIVE
GLUCOSE URINE, POC: NEGATIVE
KETONES, URINE, POC: NORMAL
LEUKOCYTE ESTERASE, URINE, POC: NEGATIVE
NITRITE, URINE, POC: NEGATIVE
PH, URINE, POC: 6.5 (ref 4.6–8)
PROTEIN,URINE, POC: NEGATIVE
SPECIFIC GRAVITY, URINE, POC: 1.02 (ref 1–1.03)
URINALYSIS CLARITY, POC: CLEAR
URINALYSIS COLOR, POC: YELLOW
UROBILINOGEN, POC: NORMAL

## 2024-06-26 PROCEDURE — 81002 URINALYSIS NONAUTO W/O SCOPE: CPT | Performed by: OBSTETRICS & GYNECOLOGY

## 2024-06-26 PROCEDURE — 99396 PREV VISIT EST AGE 40-64: CPT | Performed by: OBSTETRICS & GYNECOLOGY

## 2024-06-26 RX ORDER — ESTRADIOL 0.1 MG/D
1 FILM, EXTENDED RELEASE TRANSDERMAL
Qty: 24 PATCH | Refills: 3 | Status: SHIPPED | OUTPATIENT
Start: 2024-06-27

## 2024-08-21 LAB
CHOLEST SERPL-MCNC: 226 MG/DL (ref 0–200)
GLUCOSE SERPL-MCNC: 90 MG/DL (ref 70–99)
HDLC SERPL-MCNC: 84 MG/DL (ref 40–60)
LDLC SERPL CALC-MCNC: 121 MG/DL (ref 0–100)
TRIGL SERPL-MCNC: 108 MG/DL (ref 0–150)

## 2024-11-12 ENCOUNTER — HOSPITAL ENCOUNTER (OUTPATIENT)
Dept: MAMMOGRAPHY | Age: 59
Discharge: HOME | End: 2024-11-15
Attending: OBSTETRICS & GYNECOLOGY
Payer: COMMERCIAL

## 2024-11-12 DIAGNOSIS — R92.8 FOLLOW-UP EXAMINATION OF ABNORMAL MAMMOGRAM: ICD-10-CM

## 2024-11-12 PROCEDURE — G0279 TOMOSYNTHESIS, MAMMO: HCPCS

## 2024-11-12 PROCEDURE — 76642 ULTRASOUND BREAST LIMITED: CPT

## 2025-04-23 DIAGNOSIS — Z79.890 HORMONE REPLACEMENT THERAPY (HRT): ICD-10-CM

## 2025-04-23 RX ORDER — ESTRADIOL 0.1 MG/D
PATCH, EXTENDED RELEASE TRANSDERMAL
Qty: 24 PATCH | Refills: 3 | OUTPATIENT
Start: 2025-04-23

## 2025-04-24 ENCOUNTER — TRANSCRIBE ORDERS (OUTPATIENT)
Dept: SCHEDULING | Age: 60
End: 2025-04-24

## 2025-04-24 DIAGNOSIS — Z12.31 VISIT FOR SCREENING MAMMOGRAM: Primary | ICD-10-CM

## 2025-06-11 ENCOUNTER — OFFICE VISIT (OUTPATIENT)
Age: 60
End: 2025-06-11
Payer: COMMERCIAL

## 2025-06-11 VITALS
HEART RATE: 71 BPM | DIASTOLIC BLOOD PRESSURE: 80 MMHG | BODY MASS INDEX: 22.33 KG/M2 | SYSTOLIC BLOOD PRESSURE: 120 MMHG | WEIGHT: 134 LBS | HEIGHT: 65 IN

## 2025-06-11 DIAGNOSIS — I49.3 PVC (PREMATURE VENTRICULAR CONTRACTION): Primary | ICD-10-CM

## 2025-06-11 DIAGNOSIS — E61.1 IRON DEFICIENCY: ICD-10-CM

## 2025-06-11 DIAGNOSIS — Z82.49 FAMILY HISTORY OF HEART DISEASE: ICD-10-CM

## 2025-06-11 PROCEDURE — 93000 ELECTROCARDIOGRAM COMPLETE: CPT | Performed by: INTERNAL MEDICINE

## 2025-06-11 PROCEDURE — 99214 OFFICE O/P EST MOD 30 MIN: CPT | Performed by: INTERNAL MEDICINE

## 2025-06-11 RX ORDER — NEBIVOLOL 5 MG/1
5 TABLET ORAL DAILY
Qty: 90 TABLET | Refills: 3 | Status: SHIPPED | OUTPATIENT
Start: 2025-06-11

## 2025-06-11 NOTE — PROGRESS NOTES
recommendations and to be compliant with meds and testing. Instructed to follow up appropriately and seek urgent medical care if acute or unstable issues arise. Results of some tests may be viewed thru MyChart but this does not substitute for follow up with MD. If follow up is not scheduled pt is instructed to call for follow up.  Any non cardiac issues identified in this visit the patient is counseled to address these with their primary care physician or appropriate specialist.    ERWIN TOMAS MD

## 2025-06-16 LAB
CHOLEST SERPL-MCNC: 206 MG/DL (ref 0–200)
GLUCOSE SERPL-MCNC: 93 MG/DL (ref 70–99)
HDLC SERPL-MCNC: 80 MG/DL (ref 40–60)
LDLC SERPL CALC-MCNC: 108 MG/DL (ref 0–100)
TRIGL SERPL-MCNC: 91 MG/DL (ref 0–150)

## 2025-07-07 ASSESSMENT — PATIENT HEALTH QUESTIONNAIRE - PHQ9
SUM OF ALL RESPONSES TO PHQ QUESTIONS 1-9: 2
1. LITTLE INTEREST OR PLEASURE IN DOING THINGS: SEVERAL DAYS
1. LITTLE INTEREST OR PLEASURE IN DOING THINGS: SEVERAL DAYS
2. FEELING DOWN, DEPRESSED OR HOPELESS: SEVERAL DAYS
SUM OF ALL RESPONSES TO PHQ QUESTIONS 1-9: 2
SUM OF ALL RESPONSES TO PHQ9 QUESTIONS 1 & 2: 2
SUM OF ALL RESPONSES TO PHQ QUESTIONS 1-9: 2
SUM OF ALL RESPONSES TO PHQ QUESTIONS 1-9: 2
2. FEELING DOWN, DEPRESSED OR HOPELESS: SEVERAL DAYS

## 2025-07-08 DIAGNOSIS — I49.3 PVC (PREMATURE VENTRICULAR CONTRACTION): ICD-10-CM

## 2025-07-08 DIAGNOSIS — E61.1 IRON DEFICIENCY: ICD-10-CM

## 2025-07-08 DIAGNOSIS — Z82.49 FAMILY HISTORY OF HEART DISEASE: ICD-10-CM

## 2025-07-08 LAB
ALBUMIN SERPL-MCNC: 3.8 G/DL (ref 3.5–5)
ALBUMIN/GLOB SERPL: 1.6 (ref 1–1.9)
ALP SERPL-CCNC: 75 U/L (ref 35–104)
ALT SERPL-CCNC: 20 U/L (ref 8–45)
ANION GAP SERPL CALC-SCNC: 12 MMOL/L (ref 7–16)
AST SERPL-CCNC: 23 U/L (ref 15–37)
BASOPHILS # BLD: 0.07 K/UL (ref 0–0.2)
BASOPHILS NFR BLD: 1 % (ref 0–2)
BILIRUB SERPL-MCNC: 0.4 MG/DL (ref 0–1.2)
BUN SERPL-MCNC: 22 MG/DL (ref 6–23)
CALCIUM SERPL-MCNC: 9.8 MG/DL (ref 8.8–10.2)
CHLORIDE SERPL-SCNC: 101 MMOL/L (ref 98–107)
CHOLEST SERPL-MCNC: 197 MG/DL (ref 0–200)
CO2 SERPL-SCNC: 26 MMOL/L (ref 20–29)
CREAT SERPL-MCNC: 0.82 MG/DL (ref 0.6–1.1)
DIFFERENTIAL METHOD BLD: NORMAL
EOSINOPHIL # BLD: 0.16 K/UL (ref 0–0.8)
EOSINOPHIL NFR BLD: 2.2 % (ref 0.5–7.8)
ERYTHROCYTE [DISTWIDTH] IN BLOOD BY AUTOMATED COUNT: 14.1 % (ref 11.9–14.6)
EST. AVERAGE GLUCOSE BLD GHB EST-MCNC: 102 MG/DL
GLOBULIN SER CALC-MCNC: 2.4 G/DL (ref 2.3–3.5)
GLUCOSE SERPL-MCNC: 83 MG/DL (ref 70–99)
HBA1C MFR BLD: 5.2 % (ref 0–5.6)
HCT VFR BLD AUTO: 41.6 % (ref 35.8–46.3)
HDLC SERPL-MCNC: 80 MG/DL (ref 40–60)
HDLC SERPL: 2.5 (ref 0–5)
HGB BLD-MCNC: 13.3 G/DL (ref 11.7–15.4)
IMM GRANULOCYTES # BLD AUTO: 0.01 K/UL (ref 0–0.5)
IMM GRANULOCYTES NFR BLD AUTO: 0.1 % (ref 0–5)
LDLC SERPL CALC-MCNC: 100 MG/DL (ref 0–100)
LDLC SERPL DIRECT ASSAY-MCNC: 107 MG/DL (ref 0–100)
LYMPHOCYTES # BLD: 2.38 K/UL (ref 0.5–4.6)
LYMPHOCYTES NFR BLD: 32.4 % (ref 13–44)
MCH RBC QN AUTO: 29.6 PG (ref 26.1–32.9)
MCHC RBC AUTO-ENTMCNC: 32 G/DL (ref 31.4–35)
MCV RBC AUTO: 92.4 FL (ref 82–102)
MONOCYTES # BLD: 0.42 K/UL (ref 0.1–1.3)
MONOCYTES NFR BLD: 5.7 % (ref 4–12)
NEUTS SEG # BLD: 4.3 K/UL (ref 1.7–8.2)
NEUTS SEG NFR BLD: 58.6 % (ref 43–78)
NRBC # BLD: 0 K/UL (ref 0–0.2)
PLATELET # BLD AUTO: 247 K/UL (ref 150–450)
PMV BLD AUTO: 10.7 FL (ref 9.4–12.3)
POTASSIUM SERPL-SCNC: 4.3 MMOL/L (ref 3.5–5.1)
PROT SERPL-MCNC: 6.2 G/DL (ref 6.3–8.2)
RBC # BLD AUTO: 4.5 M/UL (ref 4.05–5.2)
SODIUM SERPL-SCNC: 139 MMOL/L (ref 136–145)
T4 FREE SERPL-MCNC: 1.1 NG/DL (ref 0.9–1.7)
TRIGL SERPL-MCNC: 84 MG/DL (ref 0–150)
TSH, 3RD GENERATION: 1.72 UIU/ML (ref 0.27–4.2)
VLDLC SERPL CALC-MCNC: 17 MG/DL (ref 6–23)
WBC # BLD AUTO: 7.3 K/UL (ref 4.3–11.1)

## 2025-07-09 ENCOUNTER — OFFICE VISIT (OUTPATIENT)
Dept: INTERNAL MEDICINE CLINIC | Facility: CLINIC | Age: 60
End: 2025-07-09
Payer: COMMERCIAL

## 2025-07-09 VITALS
WEIGHT: 134.6 LBS | HEART RATE: 79 BPM | SYSTOLIC BLOOD PRESSURE: 120 MMHG | OXYGEN SATURATION: 98 % | HEIGHT: 65 IN | DIASTOLIC BLOOD PRESSURE: 68 MMHG | BODY MASS INDEX: 22.42 KG/M2

## 2025-07-09 DIAGNOSIS — D36.9 TUBULOVILLOUS ADENOMA: ICD-10-CM

## 2025-07-09 DIAGNOSIS — R10.84 GENERALIZED ABDOMINAL PAIN: ICD-10-CM

## 2025-07-09 DIAGNOSIS — Z78.0 POSTMENOPAUSAL ESTROGEN DEFICIENCY: ICD-10-CM

## 2025-07-09 DIAGNOSIS — Z00.00 ANNUAL PHYSICAL EXAM: Primary | ICD-10-CM

## 2025-07-09 DIAGNOSIS — Z86.0100 HISTORY OF COLONIC POLYPS: ICD-10-CM

## 2025-07-09 DIAGNOSIS — Z87.891 FORMER SMOKER: ICD-10-CM

## 2025-07-09 DIAGNOSIS — R14.0 ABDOMINAL BLOATING: ICD-10-CM

## 2025-07-09 DIAGNOSIS — D22.9 MULTIPLE NEVI: ICD-10-CM

## 2025-07-09 DIAGNOSIS — K59.09 CHRONIC CONSTIPATION: ICD-10-CM

## 2025-07-09 DIAGNOSIS — E66.9 OBESITY (BMI 30-39.9): ICD-10-CM

## 2025-07-09 DIAGNOSIS — J30.9 ALLERGIC RHINITIS, UNSPECIFIED SEASONALITY, UNSPECIFIED TRIGGER: ICD-10-CM

## 2025-07-09 DIAGNOSIS — F43.9 SITUATIONAL STRESS: ICD-10-CM

## 2025-07-09 PROCEDURE — 99213 OFFICE O/P EST LOW 20 MIN: CPT | Performed by: NURSE PRACTITIONER

## 2025-07-09 PROCEDURE — 99396 PREV VISIT EST AGE 40-64: CPT | Performed by: NURSE PRACTITIONER

## 2025-07-09 RX ORDER — OMEPRAZOLE 40 MG/1
40 CAPSULE, DELAYED RELEASE ORAL
Qty: 30 CAPSULE | Refills: 2 | Status: SHIPPED | OUTPATIENT
Start: 2025-07-09

## 2025-07-09 RX ORDER — FLUTICASONE PROPIONATE 50 MCG
2 SPRAY, SUSPENSION (ML) NASAL DAILY
Qty: 16 G | Refills: 5 | Status: SHIPPED | OUTPATIENT
Start: 2025-07-09

## 2025-07-09 SDOH — ECONOMIC STABILITY: FOOD INSECURITY: WITHIN THE PAST 12 MONTHS, THE FOOD YOU BOUGHT JUST DIDN'T LAST AND YOU DIDN'T HAVE MONEY TO GET MORE.: NEVER TRUE

## 2025-07-09 SDOH — ECONOMIC STABILITY: FOOD INSECURITY: WITHIN THE PAST 12 MONTHS, YOU WORRIED THAT YOUR FOOD WOULD RUN OUT BEFORE YOU GOT MONEY TO BUY MORE.: NEVER TRUE

## 2025-07-09 ASSESSMENT — ENCOUNTER SYMPTOMS
NAUSEA: 0
COUGH: 0
BLOOD IN STOOL: 0
BACK PAIN: 0
ABDOMINAL PAIN: 0
CONSTIPATION: 1
COLOR CHANGE: 0
EYE REDNESS: 0
DIARRHEA: 0
EYE ITCHING: 0
SHORTNESS OF BREATH: 0

## 2025-07-09 ASSESSMENT — PATIENT HEALTH QUESTIONNAIRE - PHQ9
SUM OF ALL RESPONSES TO PHQ QUESTIONS 1-9: 0
2. FEELING DOWN, DEPRESSED OR HOPELESS: NOT AT ALL
1. LITTLE INTEREST OR PLEASURE IN DOING THINGS: NOT AT ALL
SUM OF ALL RESPONSES TO PHQ QUESTIONS 1-9: 0

## 2025-07-09 NOTE — PROGRESS NOTES
resolved.    Stress  - She has been under severe stress over the past year, leading to physical illness, including feeling physically sick and depressed, with stomach pain.  - She has been avoiding going home due to relationship issues.  - She has not seen her psychiatrist since last year, although she has an appointment scheduled for tomorrow.    Health Maintenance  - She had a hysterectomy at age 30 due to precancerous cells and was informed that she no longer needs Pap smears as she does not have a cervix.  - She uses an estradiol patch twice a week.  - She is due for a 3D mammogram, which she postponed due to work commitments.  - She has not had a shingles vaccine.    PAST SURGICAL HISTORY:  - Hysterectomy at age 30 due to precancerous cells  Review of Systems   Constitutional:  Negative for activity change, appetite change, fatigue and unexpected weight change.   HENT:  Negative for congestion and hearing loss.    Eyes:  Negative for redness, itching and visual disturbance.   Respiratory:  Negative for cough and shortness of breath.    Cardiovascular:  Negative for chest pain, palpitations and leg swelling.   Gastrointestinal:  Positive for constipation. Negative for abdominal pain, blood in stool, diarrhea and nausea.   Genitourinary:  Negative for dysuria, frequency, hematuria and urgency.   Musculoskeletal:  Negative for arthralgias, back pain, myalgias and neck pain.   Skin:  Negative for color change and rash.   Neurological:  Negative for dizziness, weakness, numbness and headaches.   Psychiatric/Behavioral:  Positive for dysphoric mood. Negative for sleep disturbance. The patient is nervous/anxious.         OBJECTIVE      /68 (BP Site: Left Upper Arm, Patient Position: Sitting, BP Cuff Size: Small Adult)   Pulse 79   Ht 1.651 m (5' 5\")   Wt 61.1 kg (134 lb 9.6 oz)   SpO2 98%   BMI 22.40 kg/m²   Physical Exam  Vitals and nursing note reviewed.   Constitutional:       General: She is not in

## 2025-07-10 LAB
APO B SERPL-MCNC: 73 MG/DL
LPA SERPL-SCNC: 31.6 NMOL/L

## 2025-07-29 ENCOUNTER — HOSPITAL ENCOUNTER (OUTPATIENT)
Dept: MAMMOGRAPHY | Age: 60
Discharge: HOME | End: 2025-08-01
Payer: COMMERCIAL

## 2025-07-29 VITALS — BODY MASS INDEX: 21.99 KG/M2 | HEIGHT: 65 IN | WEIGHT: 132 LBS

## 2025-07-29 DIAGNOSIS — Z12.31 VISIT FOR SCREENING MAMMOGRAM: ICD-10-CM

## 2025-07-29 PROCEDURE — 77063 BREAST TOMOSYNTHESIS BI: CPT

## 2025-09-03 ENCOUNTER — OFFICE VISIT (OUTPATIENT)
Dept: INTERNAL MEDICINE CLINIC | Facility: CLINIC | Age: 60
End: 2025-09-03

## 2025-09-03 VITALS
HEIGHT: 65 IN | SYSTOLIC BLOOD PRESSURE: 118 MMHG | DIASTOLIC BLOOD PRESSURE: 76 MMHG | BODY MASS INDEX: 22.26 KG/M2 | WEIGHT: 133.6 LBS

## 2025-09-03 DIAGNOSIS — R10.84 GENERALIZED ABDOMINAL PAIN: Primary | ICD-10-CM

## 2025-09-03 DIAGNOSIS — F51.01 PRIMARY INSOMNIA: ICD-10-CM

## 2025-09-03 DIAGNOSIS — K59.09 CHRONIC CONSTIPATION: ICD-10-CM

## 2025-09-03 RX ORDER — OMEPRAZOLE 40 MG/1
40 CAPSULE, DELAYED RELEASE ORAL
Qty: 90 CAPSULE | Refills: 3 | Status: SHIPPED | OUTPATIENT
Start: 2025-09-03

## 2025-09-03 ASSESSMENT — ENCOUNTER SYMPTOMS: ABDOMINAL PAIN: 0

## (undated) DEVICE — SYR 3ML LL TIP 1/10ML GRAD --

## (undated) DEVICE — CANNULA NSL ORAL AD FOR CAPNOFLEX CO2 O2 AIRLFE

## (undated) DEVICE — BLOCK BITE AD 60FR W/ VELC STRP ADDRESSES MOST PT AND

## (undated) DEVICE — SYR 5ML 1/5 GRAD LL NSAF LF --

## (undated) DEVICE — KENDALL RADIOLUCENT FOAM MONITORING ELECTRODE RECTANGULAR SHAPE: Brand: KENDALL

## (undated) DEVICE — CONNECTOR TBNG OD5-7MM O2 END DISP

## (undated) DEVICE — NDL PRT INJ NSAF BLNT 18GX1.5 --